# Patient Record
Sex: MALE | Race: WHITE | NOT HISPANIC OR LATINO | Employment: STUDENT | URBAN - METROPOLITAN AREA
[De-identification: names, ages, dates, MRNs, and addresses within clinical notes are randomized per-mention and may not be internally consistent; named-entity substitution may affect disease eponyms.]

---
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
## 2020-06-22 ENCOUNTER — TELEPHONE (OUTPATIENT)
Dept: CARDIOLOGY | Facility: CLINIC | Age: 20
End: 2020-06-22

## 2020-06-22 NOTE — TELEPHONE ENCOUNTER
[facility] Call Center    Phone Message    May a detailed message be left on voicemail: yes     Reason for Call:   Evin's mom Dariusz calling to schedule an appointment for Evin with Dr. Santos in the congenital cardiology clinic. Evin was diagnosed with mild pulmonary valve stenosis, mild dilation of his main pulmonary artery and right branch pulmonary artery, and borderline dilation of his ascending aorta. Jero was a patient of Dr. Theodore Calvo's at the [facility] in [place], but due to insurance changes, Evin is not able to continue his care there. Dariusz mentioned that lately Evin has been having his arms fall asleep on occasion and that when he runs up a flight of stairs he get sort of lightheaded. These started back in February. Dariusz was given the cardiology fax number, and will reach out to [place] to start transferring records. Meghan was not available at the time of the call. Please give Dariusz a call back at your earliest convenience to discuss.  Action Taken: Message routed to:  [facility] ([facility]  Cardio    Travel Screening: Not Applicable

## 2020-06-29 ENCOUNTER — CARE COORDINATION (OUTPATIENT)
Dept: CARDIOLOGY | Facility: CLINIC | Age: 20
End: 2020-06-29

## 2020-06-29 DIAGNOSIS — I37.0 PULMONARY STENOSIS: Primary | ICD-10-CM

## 2020-06-29 DIAGNOSIS — I28.8 DILATATION OF PULMONIC ARTERY (H): ICD-10-CM

## 2020-06-29 NOTE — PROGRESS NOTES
Date: 6/29/2020    Time of Call: 9:57 AM     Diagnosis: History of trivial pulmonary valve stenosis, mild to moderate dilation of the main pulmonary artery and right pulmonary artery, and borderline dilationof the ascenind aorta    [ TORB ] Ordering provider: LEONARDA Provider  Order: Follow up with provider with congenital echo, fasting labs, and EKG     Order received by: Clemente Powers RN     Follow-up/additional notes: NA

## 2020-07-01 NOTE — TELEPHONE ENCOUNTER
Attempted to reach out to Dariusz. The number listed has a mailbox that is not set up. Unable to leave VM.

## 2020-07-17 ENCOUNTER — VIRTUAL VISIT (OUTPATIENT)
Dept: URGENT CARE | Facility: CLINIC | Age: 20
End: 2020-07-17
Payer: COMMERCIAL

## 2020-07-17 ENCOUNTER — COMMUNICATION - HEALTHEAST (OUTPATIENT)
Dept: SCHEDULING | Facility: CLINIC | Age: 20
End: 2020-07-17

## 2020-07-17 ENCOUNTER — NURSE TRIAGE (OUTPATIENT)
Dept: NURSING | Facility: CLINIC | Age: 20
End: 2020-07-17

## 2020-07-17 DIAGNOSIS — Z20.822 SUSPECTED COVID-19 VIRUS INFECTION: ICD-10-CM

## 2020-07-17 DIAGNOSIS — R05.9 COUGH: Primary | ICD-10-CM

## 2020-07-17 PROCEDURE — 99203 OFFICE O/P NEW LOW 30 MIN: CPT | Mod: TEL | Performed by: FAMILY MEDICINE

## 2020-07-17 NOTE — PATIENT INSTRUCTIONS
Instructions for Patients  To schedule an appointment for curbside testing:    Provider/Staff to call and schedule the patient for the appointment per the System Ambulatory Workflow recommendations    Be sure to obtain details about the patients  care for the      Your symptoms show that you may have coronavirus (COVID-19). This illness can cause fever, cough and trouble breathing.     We would like to test you for this virus. This will be a curbside test--you will drive to the clinic, and we will test you in your car.    Please follow these steps:    1. We will call to schedule your test. Be ready to share details about the car you ll be in.   2. A member of our care team will ask you some questions. Then, they will use a swab to collect samples from your nose and throat.     We will test your samples for COVID-19, the flu and maybe other illnesses as well. We will call to share your test results.    How can I protect others?    Stay home and away from others (self-isolate) until:    You ve had no fever--and no medicine that reduces fever--for 3 full days (72 hours). And      Your other symptoms have resolved (gotten better). For example, your cough or breathing has improved. And     At least 10 days have passed since your symptoms started.    Stay at least 6 feet away from others. (If someone will drive you to your test, stay in the backseat, as far away from the  as you can.)     Don t go to work, school or anywhere else. When it s time for your test, go straight to the testing site. Don t make any stops on the way there or back.     Wash your hands and face often. Use soap and water.     Cover your mouth and nose with a mask, tissue or washcloth.     Don t touch anyone. No hugging, kissing or handshakes.    How can I take care of myself?    1. Get lots of rest. Drink extra fluids (unless a doctor has told you not to).     2. Take Tylenol (acetaminophen) for fever or pain. If you have liver or  kidney problems, ask your family doctor if it's okay to take Tylenol.     Adults can take either:     650 mg (two 325 mg pills) every 4 to 6 hours, or     1,000 mg (two 500 mg pills) every 8 hours as needed.     Note: Don't take more than 3,000 mg in one day.   Acetaminophen is found in many medicines (both prescribed and over-the-counter medicines). Read all labels to be sure you don't take too much.   For children, check the Tylenol bottle for the right dose. The dose is based on  the child's age or weight.    3. If you have other health problems (like cancer, heart failure, an organ transplant or severe kidney disease): Call your specialty clinic if you don't feel better in the next 2 days.    4. Know when to call 911: If your breathing is so bad that it keeps you from doing normal activities, call 911 or go to the emergency room. Tell them that you've been staying home and may have COVID-19.      Thank you for limiting contact with others, wearing a simple mask to cover your cough, practice good hand hygiene habits and accessing our virtual services where possible to limit the spread of this virus.    For more information about COVID19 and options for caring for yourself at home, please visit the CDC website at https://www.cdc.gov/coronavirus/2019-ncov/about/steps-when-sick.html  For more options for care at [facility], please visit our website at [email or web address]

## 2020-07-17 NOTE — TELEPHONE ENCOUNTER
Patient was in the [facility] ER two nights ago, dizzy, headache, nausea.  Tests including CT, labs, were all inconclusive.    Today he still has the same symptoms, and caller is wondering if he was tested for Covid-19.  Warm transferred to  nurse.    Varsha Hobbs RN  [facility]

## 2020-07-17 NOTE — PROGRESS NOTES
"The patient has been notified of following:     \"This telephone visit will be conducted via a call between you and your physician/provider. We have found that certain health care needs can be provided without the need for a physical exam.  This service lets us provide the care you need with a short phone conversation.  If a prescription is necessary we can send it directly to your pharmacy.  If lab work is needed we can place an order for that and you can then stop by our lab to have the test done at a later time.    Telephone visits are billed at different rates depending on your insurance coverage. During this emergency period, for some insurers they may be billed the same as an in-person visit.  Please reach out to your insurance provider with any questions.    If during the course of the call the physician/provider feels a telephone visit is not appropriate, you will not be charged for this service.\"    Patient has given verbal consent for Telephone visit?  Yes    How would you like to obtain your AVS? Mail a copy    Subjective   CC: Evin Mendoza  is a 19 year old male who presents via phone visit today for the following health issues:   Chief Complaint   Patient presents with     Suspected Covid        Concern for COVID-19  About how many days ago did these symptoms start? 3d ago  Is this your first visit for this illness? No -seen in ER 7/15 with dizziness, headache, nausea, chest pain, shortness of breath. He had a significant workup due to history including labs and CT, but not covid testing. Since then, he has generally felt better, but continues to have the same symptoms. When nauseated, he feels breathless, but that is brief and resolves. No fever. Does have bodyaches, sore throat, diarrhea. No rash or anosmia.     In the 14 days before your symptoms started, have you had close contact with someone with COVID-19 (Coronavirus)? I do not know. Works in a restaurant.  Are you, or a household member, " a healthcare worker or a ? No  Do you live in a nursing home, group home, or shelter? No  Do you have a way to get food/medications if quarantined? Yes, I have a friend or family member who can help me.                Reviewed and updated as needed this visit by Provider         Review of Systems   As above      Objective    Gen: Patient is alert, oriented  Resp: Speaking full sentence, no audible shortness of breath.  No cough of wheeze.    Diagnostic Test Results:  Labs reviewed in [facility] from [facility] ER visit.           Assessment/Plan:  1. Cough, Suspected COVID-19 virus infection: acute viral sounding illness. Recent ER visit, he is generally better than he was, but continues to have symptoms consistent with covid. Discussed worsening symptoms that should warrant further evaluation. Declines [employer]. Will arrange testing.   - Symptomatic COVID-19 Virus (Coronavirus) by PCR; Future      Phone call duration:  5 minutes    Daniela Alcantar MD

## 2020-07-20 ENCOUNTER — AMBULATORY - HEALTHEAST (OUTPATIENT)
Dept: FAMILY MEDICINE | Facility: CLINIC | Age: 20
End: 2020-07-20

## 2020-07-20 ENCOUNTER — COMMUNICATION - HEALTHEAST (OUTPATIENT)
Dept: SCHEDULING | Facility: CLINIC | Age: 20
End: 2020-07-20

## 2020-07-20 DIAGNOSIS — Z20.822 SUSPECTED COVID-19 VIRUS INFECTION: ICD-10-CM

## 2020-07-20 DIAGNOSIS — R05.9 COUGH: ICD-10-CM

## 2020-07-23 ENCOUNTER — COMMUNICATION - HEALTHEAST (OUTPATIENT)
Dept: EMERGENCY MEDICINE | Facility: CLINIC | Age: 20
End: 2020-07-23

## 2020-07-23 ENCOUNTER — COMMUNICATION - HEALTHEAST (OUTPATIENT)
Dept: CARE COORDINATION | Facility: CLINIC | Age: 20
End: 2020-07-23

## 2020-07-24 ENCOUNTER — OFFICE VISIT - HEALTHEAST (OUTPATIENT)
Dept: INTERNAL MEDICINE | Facility: CLINIC | Age: 20
End: 2020-07-24

## 2020-07-24 DIAGNOSIS — G43.909 MIGRAINE WITHOUT STATUS MIGRAINOSUS, NOT INTRACTABLE, UNSPECIFIED MIGRAINE TYPE: ICD-10-CM

## 2020-07-24 DIAGNOSIS — G93.9 LESION OF BRAIN: ICD-10-CM

## 2020-07-24 DIAGNOSIS — M35.9 DISORDER OF CONNECTIVE TISSUE (H): ICD-10-CM

## 2020-07-24 DIAGNOSIS — I77.810 ASCENDING AORTA DILATATION (H): ICD-10-CM

## 2020-07-24 ASSESSMENT — MIFFLIN-ST. JEOR: SCORE: 1758.34

## 2020-08-03 ENCOUNTER — COMMUNICATION - HEALTHEAST (OUTPATIENT)
Dept: INTERNAL MEDICINE | Facility: CLINIC | Age: 20
End: 2020-08-03

## 2020-08-03 ENCOUNTER — HOSPITAL ENCOUNTER (OUTPATIENT)
Dept: MRI IMAGING | Facility: CLINIC | Age: 20
Discharge: HOME | End: 2020-08-03
Attending: INTERNAL MEDICINE

## 2020-08-03 DIAGNOSIS — G93.9 LESION OF BRAIN: ICD-10-CM

## 2020-08-04 ENCOUNTER — HOSPITAL ENCOUNTER (OUTPATIENT)
Dept: CARDIOLOGY | Facility: CLINIC | Age: 20
Discharge: HOME | End: 2020-08-04
Payer: COMMERCIAL

## 2020-08-04 ENCOUNTER — AMBULATORY - HEALTHEAST (OUTPATIENT)
Dept: INTERNAL MEDICINE | Facility: CLINIC | Age: 20
End: 2020-08-04

## 2020-08-04 ENCOUNTER — RECORDS - HEALTHEAST (OUTPATIENT)
Dept: HEALTH INFORMATION MANAGEMENT | Facility: CLINIC | Age: 20
End: 2020-08-04

## 2020-08-04 DIAGNOSIS — G43.909 MIGRAINE WITHOUT STATUS MIGRAINOSUS, NOT INTRACTABLE, UNSPECIFIED MIGRAINE TYPE: ICD-10-CM

## 2020-08-04 DIAGNOSIS — G93.9 LESION OF BRAIN: ICD-10-CM

## 2020-08-04 DIAGNOSIS — I37.0 PULMONARY STENOSIS: ICD-10-CM

## 2020-08-04 DIAGNOSIS — I28.8 DILATATION OF PULMONIC ARTERY (H): ICD-10-CM

## 2020-08-04 PROCEDURE — 93325 DOPPLER ECHO COLOR FLOW MAPG: CPT

## 2020-08-11 ENCOUNTER — ANCILLARY PROCEDURE (OUTPATIENT)
Dept: CARDIOLOGY | Facility: CLINIC | Age: 20
End: 2020-08-11
Payer: COMMERCIAL

## 2020-08-11 ENCOUNTER — OFFICE VISIT (OUTPATIENT)
Dept: CARDIOLOGY | Facility: CLINIC | Age: 20
End: 2020-08-11
Attending: INTERNAL MEDICINE
Payer: COMMERCIAL

## 2020-08-11 VITALS
HEART RATE: 54 BPM | SYSTOLIC BLOOD PRESSURE: 123 MMHG | HEIGHT: 71 IN | TEMPERATURE: 97.3 F | DIASTOLIC BLOOD PRESSURE: 80 MMHG | OXYGEN SATURATION: 95 % | WEIGHT: 161 LBS | BODY MASS INDEX: 22.54 KG/M2

## 2020-08-11 DIAGNOSIS — R03.0 ELEVATED BLOOD PRESSURE READING WITHOUT DIAGNOSIS OF HYPERTENSION: ICD-10-CM

## 2020-08-11 DIAGNOSIS — I28.8 DILATATION OF PULMONIC ARTERY (H): ICD-10-CM

## 2020-08-11 DIAGNOSIS — I37.0 PULMONARY STENOSIS: ICD-10-CM

## 2020-08-11 DIAGNOSIS — R55 SYNCOPE: ICD-10-CM

## 2020-08-11 DIAGNOSIS — R55 VASOVAGAL SYNCOPE: ICD-10-CM

## 2020-08-11 DIAGNOSIS — I37.0 NONRHEUMATIC PULMONARY VALVE STENOSIS: Primary | ICD-10-CM

## 2020-08-11 LAB
CHOLEST SERPL-MCNC: 162 MG/DL
HDLC SERPL-MCNC: 53 MG/DL
LDLC SERPL CALC-MCNC: 94 MG/DL
NONHDLC SERPL-MCNC: 109 MG/DL
TRIGL SERPL-MCNC: 74 MG/DL

## 2020-08-11 PROCEDURE — 93005 ELECTROCARDIOGRAM TRACING: CPT | Mod: XU

## 2020-08-11 PROCEDURE — 0298T LEADLESS EKG MONITOR 3 TO 14 DAYS: CPT | Mod: ZP | Performed by: INTERNAL MEDICINE

## 2020-08-11 PROCEDURE — 93010 ELECTROCARDIOGRAM REPORT: CPT | Mod: ZP | Performed by: INTERNAL MEDICINE

## 2020-08-11 PROCEDURE — 0296T LEADLESS EKG MONITOR 3 TO 14 DAYS: CPT | Mod: ZF

## 2020-08-11 PROCEDURE — G0463 HOSPITAL OUTPT CLINIC VISIT: HCPCS | Mod: 25,ZF

## 2020-08-11 RX ORDER — METOCLOPRAMIDE 10 MG/1
10 TABLET ORAL
COMMUNITY
Start: 2020-07-20 | End: 2020-08-21

## 2020-08-11 RX ORDER — ONDANSETRON 4 MG/1
4 TABLET, ORALLY DISINTEGRATING ORAL
COMMUNITY
Start: 2020-07-20 | End: 2021-04-14

## 2020-08-11 ASSESSMENT — MIFFLIN-ST. JEOR: SCORE: 1767.42

## 2020-08-11 ASSESSMENT — PAIN SCALES - GENERAL: PAINLEVEL: NO PAIN (0)

## 2020-08-11 NOTE — PATIENT INSTRUCTIONS
You were seen today in the Adult Congenital and Cardiovascular Genetics Clinic at the [facility].    Cardiology Providers you saw during your visit:  Dr Jeremy Cottrell    Diagnosis:  History of pulmonary stenosis    Results:  Dr Cottrell reviewed the results of your echo and labs with you in clinic today     Recommendations:    1.  Continue to eat a heart healthy, low salt diet.  2.  Continue to get 20-30 minutes of aerobic activity, 4-5 days per week.  Examples of aerobic activity include walking, running, swimming, cycling, etc.  Please include light strength training with your exercise routine.   3.  Continue to observe good oral hygiene, with regular dental visits.  4.  We will place an ambulatory blood pressure monitor  5.  We will place a 7 day zio monitor for you to wear  6.  Dr Cottrell's email address is [email or web address]  7.  Please drink at least 80 ounces of fluid with at least 2 servings having electrolytes in them      SBE prophylaxis:   Yes____  No__x__    Lifelong Bacterial Endocarditis Prophylaxis:  YES____  NO____    If YES is checked, follow the recommendations outlined below:   1. Take antibiotic(s) prior to recommended dental procedures and procedures on the respiratory tract or with infected skin, muscle or bones. SBE prophylaxis is not needed for routine GI and  procedures (ie. Colonoscopy or vaginal delivery)   2. Observe good oral hygiene daily, as advised by your dentist. Get regular professional dental care.   3. Keep cuts clean.   4. Infections should be treated promptly.   5. Symptoms of Infective Endocarditis could include: fever lasting more than 4-5 days or a recurrent fever that initially resolves but returns within 1-2 days)     Exercise restrictions:   Yes____  No__x__         If yes, list restrictions:  Must be allowed to rest if fatigued or SOB      Work restrictions:  Yes____  No_x___         If yes, list restrictions:    FASTING CHOLESTEROL was checked in the last 5 years  YES_x__  NO___  2020  Continue to eat a heart healthy, low salt diet.         ____ Fasting lipid panel order today         ____ No changes in medications          ____ I recommend the following changes in your cholesterol medications.:          ____ Please follow up for cholesterol screening at your primary care physician      Follow-up:  Follow up with Dr Cottrell in December with a possible stress test.      For after hours urgent needs, call [phone] and ask to speak to the Adult Congenital Physician on call.  Mention Job Code 0401.    For emergencies call 521.    For any scheduling needs, please call Meghan Andersen Procedure , at [phone]  Thank you for your visit today!  If you have questions or concerns about today's visit, please call me.    Clemente Powers RN, BSN  Cardiology Care Coordinator  [facility]  [phone] [address]  Mail Code [id number]  [place] [address]

## 2020-08-11 NOTE — PROGRESS NOTES
Per Dr. Cottrell, patient to have 7 day Zio Patch monitor placed.  Diagnosis: Syncope, R55  Monitor placed: Yes  Patient Instructed: Yes  Patient verbalized understanding: Yes  Holter # [id number]    Monitor placed by Gaetano Gonzalez. Documented by Alverto Mcmahon CMA  1:30 PM

## 2020-08-11 NOTE — LETTER
Date:September 11, 2020      Provider requested that no letter be sent. Do not send.       [facility] Health Information

## 2020-08-11 NOTE — LETTER
8/11/2020      RE: Evin Mendoza  [address] [address]       Dear Colleague,    Thank you for the opportunity to participate in the care of your patient, Evin Mendoza, at the [facility]. Please see a copy of my visit note below.    Pending    Please do not hesitate to contact me if you have any questions/concerns.     Sincerely,     Jeremy Cottrell MD

## 2020-08-12 LAB — INTERPRETATION ECG - MUSE: NORMAL

## 2020-08-19 ENCOUNTER — HOSPITAL ENCOUNTER (OUTPATIENT)
Dept: CARDIOLOGY | Facility: CLINIC | Age: 20
Discharge: HOME | End: 2020-08-19
Payer: COMMERCIAL

## 2020-08-19 DIAGNOSIS — R03.0 ELEVATED BLOOD PRESSURE READING WITHOUT DIAGNOSIS OF HYPERTENSION: ICD-10-CM

## 2020-08-19 PROCEDURE — 93786 AMBL BP MNTR W/SW REC ONLY: CPT

## 2020-08-19 PROCEDURE — 93790 AMBL BP MNTR W/SW I&R: CPT | Performed by: INTERNAL MEDICINE

## 2020-08-20 SDOH — HEALTH STABILITY: MENTAL HEALTH: HOW OFTEN DO YOU HAVE A DRINK CONTAINING ALCOHOL?: NEVER

## 2020-08-21 ENCOUNTER — OFFICE VISIT (OUTPATIENT)
Dept: NEUROLOGY | Facility: CLINIC | Age: 20
End: 2020-08-21
Payer: COMMERCIAL

## 2020-08-21 ENCOUNTER — RECORDS - HEALTHEAST (OUTPATIENT)
Dept: ADMINISTRATIVE | Facility: OTHER | Age: 20
End: 2020-08-21

## 2020-08-21 VITALS — HEIGHT: 71 IN | WEIGHT: 161 LBS | BODY MASS INDEX: 22.54 KG/M2

## 2020-08-21 DIAGNOSIS — G43.809 VESTIBULAR MIGRAINE: Primary | ICD-10-CM

## 2020-08-21 PROBLEM — I28.8 DILATATION OF PULMONIC ARTERY (H): Status: ACTIVE | Noted: 2020-07-24

## 2020-08-21 PROBLEM — G43.909 MIGRAINE HEADACHE: Status: ACTIVE | Noted: 2018-11-08

## 2020-08-21 PROBLEM — D17.9 BENIGN LIPOMATOUS TUMOR: Status: ACTIVE | Noted: 2020-08-21

## 2020-08-21 PROBLEM — H90.5 SENSORINEURAL HEARING LOSS: Status: ACTIVE | Noted: 2018-11-08

## 2020-08-21 PROBLEM — M35.9 DISORDER OF CONNECTIVE TISSUE (H): Status: ACTIVE | Noted: 2020-07-24

## 2020-08-21 PROBLEM — I77.810 ASCENDING AORTA DILATATION (H): Status: ACTIVE | Noted: 2017-04-12

## 2020-08-21 PROCEDURE — 99205 OFFICE O/P NEW HI 60 MIN: CPT | Performed by: PSYCHIATRY & NEUROLOGY

## 2020-08-21 RX ORDER — RIZATRIPTAN BENZOATE 10 MG/1
10 TABLET, ORALLY DISINTEGRATING ORAL
Qty: 18 TABLET | Refills: 11 | Status: SHIPPED | OUTPATIENT
Start: 2020-08-21 | End: 2022-11-09

## 2020-08-21 RX ORDER — DIVALPROEX SODIUM 500 MG/1
500 TABLET, EXTENDED RELEASE ORAL DAILY
Qty: 30 TABLET | Refills: 11 | Status: SHIPPED | OUTPATIENT
Start: 2020-08-21 | End: 2021-04-14

## 2020-08-21 ASSESSMENT — MIFFLIN-ST. JEOR: SCORE: 1767.42

## 2020-08-21 NOTE — LETTER
2020         RE: Evin Mendoza  [address] [address]        Dear Colleague,    Thank you for referring your patient, Evin Mendoza, to the [facility] [place]. Please see a copy of my visit note below.    NEUROLOGY CONSULTATION NOTE       [facility] [place]  [address] [place] [address]  Tel: [phone]  Fax: [phone]  [email or web address]     Evin Mendoza,  2000, MRN [id number]  PCP: Jeremy Cottrell, [phone]  Date: 2020     ASSESSMENT & PLAN     Diagnosis code: Vestibular Migraine     Vestibular migraine  Pleasant 19-year-old male with history of pulmonary valve stenosis, borderline dilatation of ascending aorta, hyperextensibility, right cerebellopontine angle lipoma, left posterior thalamic indeterminate lesion who was referred for evaluation of progressively worsening headaches associated with vertigo.  His symptoms raise the possibility of vestibular migraine and as he is noticing increased headache frequency and intensity I have started him on Depakote  mg nightly.  For abortive treatment he will use Maxalt MLT 10 mg at the onset, he can repeat 1 tablet in 2 hours if needed.  He recently had a repeat MRI of the head and right CP angle lipoma and left posterior thalamic lesion has remained unchanged.  Follow-up will be in 2 months.    Hyperextensibility, right CP angle lipoma & left posterior thalamic indeterminate lesion  Patient is being followed at [facility] for right CP angle lipoma and left posterior thalamic indeterminate lesion that has remained stable over the years.  He was also evaluated by ENT and was noted to have right sensorineural hearing loss and genetic testing was offered for possible Marfan syndrome, Tracie-Danlos syndrome.  Initially patient refused but he is now interested in getting that test.  As there is a family history of brain aneurysm rupture and  with his history of hyperextensibility, possible Marfan syndrome, Tracie-Danlos syndrome I have recommended checking MRA of head to rule out cerebral aneurysm.  Follow-up will be in 2 months.    Thank you again for this referral, please feel free to contact me if you have any questions.    Lew Fitzpatrick MD  [facility]  (Formerly, [facility]     REASON FOR CONSULTATION Headache       HISTORY OF PRESENT ILLNESS     We have been requested by Dr. Cottrell to evaluate Evin Mendoza who is a 19 year old  male for headaches    Patient is a pleasant 19-year-old male with history of pulmonary valve stenosis, borderline dilatation of the ascending aorta, hyperextensibility, right cerebellar pontine angle lipoma with sensorineural hearing loss and left posterior thalamic indeterminant lesion who was referred for evaluation of headaches that started in 2015.  According to patient he usually gets these headaches that are preceded by difficulty concentrating, vertigo and pressure-like feeling that will emanate from the back of his neck to the front and is associated with nausea and light sensitivity.  He has noticed that change in weather tends to make the symptoms worse.  Lately these symptoms are progressively getting worse and he is getting these symptoms almost on a daily basis.  He was seen at urgent care and was given Zofran.  In the past he was tried on Depakote that did seem to help and he weaned himself off Depakote but lately has noticed gradually increasing frequency and intensity of the headache.  He cannot identify any triggering events.  He recently had a MRI of the head that showed no acute intracranial process but there was asymmetric nonenhancing T2 flair hyperintensity in the left posterior thalamus and a small 6 x 3 mm lipoma in the right cerebellopontine angle cistern.  These abnormalities have been present for some time and he is being followed at  [facility] and looking back up to 2016 there is no significant change.    Patient was evaluated at [facility] in the past for right-sided hearing loss, migraine headaches and MRI abnormality.  As noted above it showed a left thalamic lesion and right cerebellopontine angle abnormalities that were followed over time and remained stable.  There was no mass-effect and as it has not changed over time it is quite reassuring.  Patient has hypermobility and echocardiogram abnormalities and was evaluated by genetics and cardiology and question of Marfan syndrome or Tracie-Danlos syndrome was raised.  Although genetic testing was offered they decided against it but now willing to pursue it.  As noted above he also has sensorineural hearing loss on the right side.     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    Surgical History:  He  has no past surgical history on file.     SOCIAL HISTORY     Reviewed, and he  reports that he has never smoked. He has never used smokeless tobacco. He reports that he does not drink alcohol.     FAMILY HISTORY     Reviewed, and family history includes Depression in his maternal grandmother; Hyperlipidemia in his mother; Hypertension in his maternal grandfather, mother, and paternal grandfather; Migraines in his father; Myocardial Infarction in his paternal grandfather; Osteoporosis in his maternal grandmother.     ALLERGIES     Allergies   Allergen Reactions     Amoxicillin Hives     Oxycodone Other (See Comments) and Swelling     Other reaction(s): facial swelling  Face swelling       Penicillins Hives, Rash and Unknown     Other  "reaction(s): hive         REVIEW OF SYSTEMS     A 12 point review of system was performed and was negative except as outlined in the history of present illness.     HOME MEDICATIONS       Current Outpatient Medications:      ondansetron (ZOFRAN-ODT) 4 MG ODT tab, Take 4 mg by mouth, Disp: , Rfl:      metoclopramide (REGLAN) 10 MG tablet, Take 10 mg by mouth, Disp: , Rfl:       PHYSICAL EXAM     Vital signs  Ht 1.803 m (5' 11\")   Wt 73 kg (161 lb)   BMI 22.45 kg/m      Weight:   161 lbs 0 oz    GENERAL PHYSICAL EXAM: Patient is alert and oriented x 4 in no acute distress. Neck was supple, no carotid bruits, thyromegaly, lymphadenopathy or JVD noted.   NEUROLOGICAL EXAM:  Mental Status  Patient is A&O x 4. Patient recalls 3/3 objects at 5 minutes.  Speech  Speech is clear and fluent with good repetition, comprehension, and naming both for objects and parts of an object. Written and verbal comprehension is intact.  Cranial Nerves  CN II: Visual fields are full to confrontation. Fundoscopic exam is normal with sharp discs and no vascular changes. Venous pulsations are present bilaterally. Pupils are equal and reactive to light.   CN III, IV, VI: EOMI, PERRLA  CN V: Facial sensation is intact to pinprick in all 3 divisions bilaterally. Corneal responses are intact.  CN VII: Face is symmetric with normal eye closure and smile.  CN VII: Hearing is normal to rubbing fingers  CN IX, X: Palate elevates symmetrically. Phonation is normal.  CN XI: Head turning and shoulder shrug are intact  CN XII: Tongue is midline with normal movements and no atrophy.  Motor Exam  Muscle bulk and tone are normal. No pronator drift. Strength is 5/5 bilaterally. No fasciculations noted.  Reflexes  Reflexes are 2+ and symmetric at the biceps, triceps, knees, and ankles. Plantar responses are flexor.  Sensory Exam  Light touch, pinprick, position sense, and vibration sense are intact bilaterally. No astereognosia, agraphesthesia or extinction " to bilateral simultaneous stimulation.  Coordination  Rapid alternating movements and fine finger movements are intact. No dysmetria on FNF and HKS. Romberg negative.  Gait  Posture is normal.Tandem gait is normal. Able to walk on toes and heels.     DIAGNOSTIC STUDIES     PERTINENT RADIOLOGY  Following imaging studies were reviewed:     MRI BRAIN 8/3/20  1.  No acute intracranial process.  2.  Asymmetric nonenhancing T2 FLAIR hyperintensity left posterior thalamus.  3.  Small 6 x 3 mm lipoma right cerebellopontine angle cistern.    MRI HEAD 10/3/18  Stable mild enlargement and ill-defined T2 FLAIR hyperintensity within the  dorsal left thalamus. No associated enhancement or restricted diffusion. Stable  3 x 7 mm nonenhancing T1 hyperintensity within the inferior right cerebellar  pontine angle, likely representing a small lipoma. Previous mucosal thickening  in the paranasal sinuses has near completely resolved. The remainder of the  examination is unremarkable.    MRI BRAIN 4/12/17  No significant interval change. Compared to prior examination, there has been no significant interval change. Again seen is a tiny focus of increased T2 signal without enhancement or restricted diffusion within the dorsal left thalamus. No associated mass effect. Stable appearance to the previously seen tiny approximately 6 x 2 mm right inferior cerebellopontine angle lipoma, again without mass effect. Mild mucosal thickening in the paranasal sinuses. Remainder negative.    MRI BRAIN 4/21/16   No significant change since the prior study. Again seen is a small subtle area of signal abnormality in the dorsal left thalamus without associated pathologic enhancement. Stable small right inferior cerebellopontine angle lipoma (series 500, image 16). Mucous retention cyst right maxillary sinus. Remainder negative.      PERTINENT LABS  Following labs were reviewed:  Office Visit on 08/11/2020   Component Date Value     Interpretation ECG  08/11/2020 Click View Image link to view waveform and result    Orders Only on 08/11/2020   Component Date Value     Cholesterol 08/11/2020 162      Triglycerides 08/11/2020 74      HDL Cholesterol 08/11/2020 53      LDL Cholesterol Calculat* 08/11/2020 94      Non HDL Cholesterol 08/11/2020 109         Total time spent for face to face visit, reviewing labs/imaging studies, counseling and coordination of care was: 1 Hour 15 Minutes More than 50% of this time was spent on counseling and coordination of care.      This note was dictated using voice recognition software.  Any grammatical or context distortions are unintentional and inherent to the software.       Again, thank you for allowing me to participate in the care of your patient.        Sincerely,        Lew Fitzpatrick MD

## 2020-08-21 NOTE — NURSING NOTE
Interface, Rad Results In - 08/03/2020 11:33 AM CDT    EXAM: MR BRAIN W WO CONTRAST  LOCATION: [facility]  DATE/TIME: 8/3/2020 8:41 AM    INDICATION: Headache, chronic, with new features. Change in headache pattern, abnormal brain MRI most recently imaged at [facility] in 2018 (right CPA mass), left thalamic lesion.  COMPARISON: None.  CONTRAST: 7.5 mL Gadavist.  TECHNIQUE: Routine multiplanar multisequence head MRI without and with intravenous contrast.    FINDINGS:  INTRACRANIAL CONTENTS: No acute or subacute infarct. No mass, acute hemorrhage, or extra-axial fluid collections. Subtle asymmetric T2 FLAIR hyperintense signal within the left posterior thalamus measuring 1.0 x 1.6 cm. No associated diffusion signal   change or enhancement. Otherwise normal parenchymal signal intensity. Ovoid intrinsic T1 hyperintense nodule within the right cerebellopontine angle cistern measuring 6 x 4 mm with fat attenuation on previous CT, consistent with a small lipoma. Normal   ventricles and sulci. Normal position of the cerebellar tonsils. No pathologic contrast enhancement.    Perfusion imaging shows normal symmetric pattern without focal elevated cerebral blood volume.    SELLA: No abnormality accounting for technique.    OSSEOUS STRUCTURES/SOFT TISSUES: Normal marrow signal. The major intracranial vascular flow voids are maintained.     ORBITS: No abnormality accounting for technique.     SINUSES/MASTOIDS: No paranasal sinus mucosal disease. No middle ear or mastoid effusion.     IMPRESSION:   1. No acute intracranial process.  2. Asymmetric nonenhancing T2 FLAIR hyperintensity left posterior thalamus.  3. Small 6 x 3 mm lipoma right cerebellopontine angle cistern.    Prior brain MRI from [facility] has been requested for direct comparison. When these images are available an addendum will be issued.

## 2020-08-21 NOTE — PROGRESS NOTES
NEUROLOGY CONSULTATION NOTE       [facility] [place]  [address] [place] [address]  Tel: [phone]  Fax: [phone]  [email or web address] [email or web address]     Evin Mendoza,  2000, MRN [id number]  PCP: Jeremy Cottrell, [phone]  Date: 2020     ASSESSMENT & PLAN     Diagnosis code: Vestibular Migraine     Vestibular migraine  Pleasant 19-year-old male with history of pulmonary valve stenosis, borderline dilatation of ascending aorta, hyperextensibility, right cerebellopontine angle lipoma, left posterior thalamic indeterminate lesion who was referred for evaluation of progressively worsening headaches associated with vertigo.  His symptoms raise the possibility of vestibular migraine and as he is noticing increased headache frequency and intensity I have started him on Depakote  mg nightly.  For abortive treatment he will use Maxalt MLT 10 mg at the onset, he can repeat 1 tablet in 2 hours if needed.  He recently had a repeat MRI of the head and right CP angle lipoma and left posterior thalamic lesion has remained unchanged.  Follow-up will be in 2 months.    Hyperextensibility, right CP angle lipoma & left posterior thalamic indeterminate lesion  Patient is being followed at [facility] for right CP angle lipoma and left posterior thalamic indeterminate lesion that has remained stable over the years.  He was also evaluated by ENT and was noted to have right sensorineural hearing loss and genetic testing was offered for possible Marfan syndrome, Tracie-Danlos syndrome.  Initially patient refused but he is now interested in getting that test.  As there is a family history of brain aneurysm rupture and with his history of hyperextensibility, possible Marfan syndrome, Rtacie-Danlos syndrome I have recommended checking MRA of head to rule out cerebral aneurysm.  Follow-up will be in 2 months.    Thank you again for this referral, please feel free to contact me if you  have any questions.    Lew Fitzpatrick MD  [facility]  (Formerly, [facility]     REASON FOR CONSULTATION Headache       HISTORY OF PRESENT ILLNESS     We have been requested by Dr. Cottrell to evaluate Evin Mendoza who is a 19 year old  male for headaches    Patient is a pleasant 19-year-old male with history of pulmonary valve stenosis, borderline dilatation of the ascending aorta, hyperextensibility, right cerebellar pontine angle lipoma with sensorineural hearing loss and left posterior thalamic indeterminant lesion who was referred for evaluation of headaches that started in 2015.  According to patient he usually gets these headaches that are preceded by difficulty concentrating, vertigo and pressure-like feeling that will emanate from the back of his neck to the front and is associated with nausea and light sensitivity.  He has noticed that change in weather tends to make the symptoms worse.  Lately these symptoms are progressively getting worse and he is getting these symptoms almost on a daily basis.  He was seen at urgent care and was given Zofran.  In the past he was tried on Depakote that did seem to help and he weaned himself off Depakote but lately has noticed gradually increasing frequency and intensity of the headache.  He cannot identify any triggering events.  He recently had a MRI of the head that showed no acute intracranial process but there was asymmetric nonenhancing T2 flair hyperintensity in the left posterior thalamus and a small 6 x 3 mm lipoma in the right cerebellopontine angle cistern.  These abnormalities have been present for some time and he is being followed at [facility] and looking back up to 2016 there is no significant change.    Patient was evaluated at [facility] in the past for right-sided hearing loss, migraine headaches and MRI abnormality.  As noted above it showed a left thalamic lesion and right  cerebellopontine angle abnormalities that were followed over time and remained stable.  There was no mass-effect and as it has not changed over time it is quite reassuring.  Patient has hypermobility and echocardiogram abnormalities and was evaluated by genetics and cardiology and question of Marfan syndrome or Tracie-Danlos syndrome was raised.  Although genetic testing was offered they decided against it but now willing to pursue it.  As noted above he also has sensorineural hearing loss on the right side.     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    Surgical History:  He  has no past surgical history on file.     SOCIAL HISTORY     Reviewed, and he  reports that he has never smoked. He has never used smokeless tobacco. He reports that he does not drink alcohol.     FAMILY HISTORY     Reviewed, and family history includes Depression in his maternal grandmother; Hyperlipidemia in his mother; Hypertension in his maternal grandfather, mother, and paternal grandfather; Migraines in his father; Myocardial Infarction in his paternal grandfather; Osteoporosis in his maternal grandmother.     ALLERGIES     Allergies   Allergen Reactions     Amoxicillin Hives     Oxycodone Other (See Comments) and Swelling     Other reaction(s): facial swelling  Face swelling       Penicillins Hives, Rash and Unknown     Other reaction(s): hive         REVIEW OF SYSTEMS     A 12 point review of system was performed and was negative except as outlined in the history of present illness.     HOME MEDICATIONS       Current Outpatient Medications:      ondansetron (ZOFRAN-ODT) 4 MG  "ODT tab, Take 4 mg by mouth, Disp: , Rfl:      metoclopramide (REGLAN) 10 MG tablet, Take 10 mg by mouth, Disp: , Rfl:       PHYSICAL EXAM     Vital signs  Ht 1.803 m (5' 11\")   Wt 73 kg (161 lb)   BMI 22.45 kg/m      Weight:   161 lbs 0 oz    GENERAL PHYSICAL EXAM: Patient is alert and oriented x 4 in no acute distress. Neck was supple, no carotid bruits, thyromegaly, lymphadenopathy or JVD noted.   NEUROLOGICAL EXAM:  Mental Status  Patient is A&O x 4. Patient recalls 3/3 objects at 5 minutes.  Speech  Speech is clear and fluent with good repetition, comprehension, and naming both for objects and parts of an object. Written and verbal comprehension is intact.  Cranial Nerves  CN II: Visual fields are full to confrontation. Fundoscopic exam is normal with sharp discs and no vascular changes. Venous pulsations are present bilaterally. Pupils are equal and reactive to light.   CN III, IV, VI: EOMI, PERRLA  CN V: Facial sensation is intact to pinprick in all 3 divisions bilaterally. Corneal responses are intact.  CN VII: Face is symmetric with normal eye closure and smile.  CN VII: Hearing is normal to rubbing fingers  CN IX, X: Palate elevates symmetrically. Phonation is normal.  CN XI: Head turning and shoulder shrug are intact  CN XII: Tongue is midline with normal movements and no atrophy.  Motor Exam  Muscle bulk and tone are normal. No pronator drift. Strength is 5/5 bilaterally. No fasciculations noted.  Reflexes  Reflexes are 2+ and symmetric at the biceps, triceps, knees, and ankles. Plantar responses are flexor.  Sensory Exam  Light touch, pinprick, position sense, and vibration sense are intact bilaterally. No astereognosia, agraphesthesia or extinction to bilateral simultaneous stimulation.  Coordination  Rapid alternating movements and fine finger movements are intact. No dysmetria on FNF and HKS. Romberg negative.  Gait  Posture is normal.Tandem gait is normal. Able to walk on toes and heels. "     DIAGNOSTIC STUDIES     PERTINENT RADIOLOGY  Following imaging studies were reviewed:     MRI BRAIN 8/3/20  1.  No acute intracranial process.  2.  Asymmetric nonenhancing T2 FLAIR hyperintensity left posterior thalamus.  3.  Small 6 x 3 mm lipoma right cerebellopontine angle cistern.    MRI HEAD 10/3/18  Stable mild enlargement and ill-defined T2 FLAIR hyperintensity within the  dorsal left thalamus. No associated enhancement or restricted diffusion. Stable  3 x 7 mm nonenhancing T1 hyperintensity within the inferior right cerebellar  pontine angle, likely representing a small lipoma. Previous mucosal thickening  in the paranasal sinuses has near completely resolved. The remainder of the  examination is unremarkable.    MRI BRAIN 4/12/17  No significant interval change. Compared to prior examination, there has been no significant interval change. Again seen is a tiny focus of increased T2 signal without enhancement or restricted diffusion within the dorsal left thalamus. No associated mass effect. Stable appearance to the previously seen tiny approximately 6 x 2 mm right inferior cerebellopontine angle lipoma, again without mass effect. Mild mucosal thickening in the paranasal sinuses. Remainder negative.    MRI BRAIN 4/21/16   No significant change since the prior study. Again seen is a small subtle area of signal abnormality in the dorsal left thalamus without associated pathologic enhancement. Stable small right inferior cerebellopontine angle lipoma (series 500, image 16). Mucous retention cyst right maxillary sinus. Remainder negative.      PERTINENT LABS  Following labs were reviewed:  Office Visit on 08/11/2020   Component Date Value     Interpretation ECG 08/11/2020 Click View Image link to view waveform and result    Orders Only on 08/11/2020   Component Date Value     Cholesterol 08/11/2020 162      Triglycerides 08/11/2020 74      HDL Cholesterol 08/11/2020 53      LDL Cholesterol Calculat* 08/11/2020  94      Non HDL Cholesterol 08/11/2020 109         Total time spent for face to face visit, reviewing labs/imaging studies, counseling and coordination of care was: 1 Hour 15 Minutes More than 50% of this time was spent on counseling and coordination of care.      This note was dictated using voice recognition software.  Any grammatical or context distortions are unintentional and inherent to the software.

## 2020-09-11 NOTE — PROGRESS NOTES
"Adult Congenital Cardiology New Patient Visit    Patient:  Evin Mendoza MRN:  [id number]   YOB: 2000 Age:  19 year old   Date of Visit:  Aug 11, 2020 PCP:  Unknown        I had the pleasure of seeing your patient Evin Mendoza at the [facility] on Aug 11, 2020.  Evin is a 20 yo who is here today with his mother Dariusz to establish care in our [facility] clinic for a history of aortic dilation. Alo has been cared for through the [facility] system and is transferring care locally due to changes in insurance coverage. Alo was diagnosed with aortic dilation in 2017 by Dr. Calvo. I do not have access to that study or other echos obtained at [facility] at the present time. He has not been on any medication for this and had last follow up in 2018. Family has been told that \"he grew into it\".     Evin notes that he has decrease exercise tolerance over the last year or two. He has heart rate increases to 186 with walking and gets winded with hills. He often notes a racing heart beat after exercise is complete- often to the 180's or 190's and feels pulsations in his head and neck. He feels like this has been increasing over the last few years. He also notes that he has been less active.  He does not have regular exercise induced chest pain, but rare chest discomfort that self resolves. He complains of almost constant dizziness or vertigo that is difficult to characterize. Evin has severe migraines. 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 He also has Loss of hearing in one ear and is followed by neuro/ENT at [facility]. Current plan is observation.     Evin was last evaluated at [facility] in July with a severe headache. No LOC, seizures, orthopnea, edema or other cardiac symptoms.     Evin has always been \"bendy\" with flexible joints. He has stretch marks on his skin.     Past " medical history:    Born 34-35 weeks EGA due to maternal hypertension. C/Sec due to breech presentation. BW 5 lb 2 ounces. Home at 3 days of age. Maternal thyroid tumor required operative removal 3rd trimester.    Frequent URI/OM in childhood. Normal development.   Severe migraines and CNS lipoma as noted above and in neurology records.  Ortho injuries related to skiing - followed at [facility]     Surgical Hx:  Tympanostomy tubes age approx 18 months  ? tonsillectomy  Removal Penile cyst   Broken wrist requiring surg x 2 ([facility])      Cardiac History:   Screening testing 2017 with Dr. Umesh Cortez with aortic enlargement  ?Offerred testing for connective tissue disorders  Repeat Echo 2018  CTA chest July 2020 [facility]   CT ANGIOGRAM CHEST, ABDOMEN, AND PELVIS: No aortic dissection or significant aneurysm. Ascending aorta appears borderline ectatic, measuring 3.4 cm maximal diameter. No visualized pulmonary embolism. Celiac axis, SMA, renal arteries and DALIA are patent.    No cardiac medications  Medications are for migraine management.       Current Outpatient Medications   Medication Sig Dispense Refill     divalproex sodium extended-release (DEPAKOTE ER) 500 MG 24 hr tablet Take 1 tablet (500 mg) by mouth daily 30 tablet 11     ondansetron (ZOFRAN-ODT) 4 MG ODT tab Take 4 mg by mouth       rizatriptan (MAXALT-MLT) 10 MG ODT Take 1 tablet (10 mg) by mouth at onset of headache for migraine (May repaet x 1 in 2 hours) May repeat in 2 hours. Max 3 tablets/24 hours. 18 tablet 11       Allergies   Allergen Reactions     Amoxicillin Hives     Oxycodone Other (See Comments) and Swelling     Other reaction(s): facial swelling  Face swelling       Penicillins Hives, Rash and Unknown     Other reaction(s): hive     Family History:   Family History   Problem Relation Age of Onset     Hypertension Mother      Hyperlipidemia Mother      Migraines Father      Myocardial Infarction Paternal Grandfather      Hypertension  "Paternal Grandfather      Depression Maternal Grandmother      Osteoporosis Maternal Grandmother      Hypertension Maternal Grandfather      Cerebral aneurysm Other       3 brothers, Evin is middle. Odler brother age 23 yrs- has thyroid tumor  Younger brother age 17 also \"bendy\" with stretch marks. Great uncle with cerebral hemorrhage age 23 yrs.   FH of hypertension/hyperlipidemia/migraines.     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   Social History     Occupational History     Not on file   Tobacco Use     Smoking status: Never Smoker     Smokeless tobacco: Never Used   Substance and Sexual Activity     Alcohol use: Never     Frequency: Never     Drug use: Not on file     Sexual activity: Not on file       Marital Status: Single.  Denies EtOH, smoking/vaping    Review of Systems: A comprehensive review of systems was performed and is negative, except as noted in the HPI and PMH  Review of Systems     Physical exam:  His height is 1.803 m (5' 11\") and weight is 73 kg (161 lb). His temperature is 97.3  F (36.3  C). His blood pressure is 123/80 and his pulse is 54. His oxygen saturation is 95%.   His body mass index is 22.45 kg/m .  His body surface area is 1.91 meters squared.  Evin is well appearing.   There is no central or peripheral cyanosis. Pupils are reactive and sclera are not jaundiced or discolored. There is no conjunctival injection or discharge. EOMI. Mucous membranes are moist and pink. Dentition appears healthy. Neck supple   Lungs are clear to ausculation bilaterally with no wheezes, rales or rhonchi. There is no increased work of breathing, retractions or nasal flaring. Precordium is quiet with a normally placed apical impulse. On auscultation, heart sounds are regular with normal S1 and physiologically split S2. There are no murmurs, rubs or gallops.  Abdomen is soft and non-tender without masses or hepatomegaly. Femoral pulses are normal with no brachial femoral " delay.Skin is without rashes, lesions, or significant bruising.+ Stria.  Extremities are warm and well-perfused with no cyanosis, clubbing or edema. Peripheral pulses are normal and there is < 2 sec capillary refill. Patient is alert and oriented and moves all extremities equally with normal tone.         12 Lead EKG performed today shows normal sinus bradycardia at a rate of 53  bpm with normal intervals and no chamber enlargement or hypertrophy. There is an early repolarization pattern that is most often a normal variant.     An echocardiogram performed today is notable for    Recent Results (from the past 4320 hour(s))   Echo Pediatric Congenital (TTE)    Narrative    [id number]  [id number]  [id number]  [id number]^URBAN^JEREMY^CHRISTIE                                                                  Study ID: [id number]                                                 [facility] [place] [address]                                                Phone: [phone]                                Pediatric Echocardiogram  _____________________________________________________________________________  __     Name: KAN MACEDO  Study Date: 2020 10:01 AM               Patient Location: [facility]  MRN: [id number]                               Age: 19 yrs  : 2000                               BP: 118/74 mmHg  Gender: Male                                  HR: 51  Patient Class: Outpatient                     Height: 178 cm  Ordering Provider: JEREMY DUGGAN                Weight: 72 kg  Referring Provider: JEREMY DUGGAN         BSA: 1.9 m2  Performed By: Jeremy Lynn [facility]  Report approved by: Marcelino Carballo MD  Reason For Study: Pulmonary stenosis, Dilatation of pulmonic artery  (H)  _____________________________________________________________________________  __     ------CONCLUSIONS------  Normal echocardiogram. There is normal appearance and motion of the tricuspid,  mitral, pulmonary and aortic valves. No atrial, ventricular or arterial level  shunting. RPA appears normal, the main and left PA are not seen well. The  aortic root at the sinus of Valsalva, sinotubular ridge and proximal ascending  aorta are normal. The left and right ventricles have normal chamber size, wall  thickness, and systolic function. The calculated biplane left ventricular  ejection fraction is 63 %.  _____________________________________________________________________________  __        Technical information:  A complete two dimensional, MMODE, spectral and color Doppler transthoracic  echocardiogram is performed. The study quality is good. Images are obtained  from parasternal, apical, subcostal and suprasternal notch views. ECG tracing  shows regular rhythm.     Segmental Anatomy:  There is normal atrial arrangement, with concordant atrioventricular and  ventriculoarterial connections.     Systemic and pulmonary veins:  The systemic venous return is normal. Normal coronary sinus. Color flow  demonstrates flow from two pulmonary veins entering the left atrium.     Atria and atrial septum:  Normal right atrial size. The left atrium is normal in size. There is no  atrial level shunting.        Atrioventricular valves:  The tricuspid valve is normal in appearance and motion. Trivial tricuspid  valve insufficiency. Estimated right ventricular systolic pressure is 18.2  mmHg plus right atrial pressure. The mitral valve is normal in appearance and  motion. There is no mitral valve insufficiency.     Ventricles and Ventricular Septum:  The left and right ventricles have normal chamber size, wall thickness, and  systolic function. The calculated biplane left ventricular ejection fraction  is 63 %. The calculated  single plane left ventricular ejection fraction from  the 4 chamber view is 59 %. The calculated single plane left ventricular  ejection fraction from the 2 chamber view is 57 %. There is no ventricular  level shunting.     Outflow tracts:  Normal great artery relationship. There is unobstructed flow through the right  ventricular outflow tract. The pulmonary valve motion is normal. There is  normal flow across the pulmonary valve. Trivial pulmonary valve insufficiency.  There is unobstructed flow through the left ventricular outflow tract.  Tricuspid aortic valve with normal appearance and motion. There is normal flow  across the aortic valve.     Great arteries:  The main pulmonary artery has normal appearance. There is unobstructed flow in  the main pulmonary artery. The pulmonary artery bifurcation is normal. RPA  appears normal, the main and left PA are not seen well. Normal ascending  aorta. The aortic root at the sinus of Valsalva, sinotubular ridge and  proximal ascending aorta are normal. The aortic arch appears normal. There is  unobstructed antegrade flow in the ascending, transverse arch, descending  thoracic and abdominal aorta.     Arterial Shunts:  There is no arterial level shunting.     Coronaries:  Normal origin of the right and left proximal coronary arteries from the  corresponding sinus of Valsalva by 2D. There is normal flow pattern in the  left and right coronaries by color Doppler.        Effusions, catheters, cannulas and leads:  No pericardial effusion.     MMode/2D Measurements & Calculations  LA dimension: 3.2 cm          Ao Arch Diam (Proximal trans.): 2.6 cm                                Ao root diam: 2.8 cm  LA/Ao: 1.1                    2 Chamber EF: 67.0 %  4 Chamber EF: 59.0 %          EF Biplane: 63.0 %  LVMI(BSA): 76.7 grams/m2      LVMI(Height): 30.6     RWT(MM): 0.25     Doppler Measurements & Calculations  MV E max teri: 88.8 cm/sec               Ao V2 max: 88.4 cm/sec  MV A  max teri: 38.0 cm/sec               Ao max PG: 3.1 mmHg  MV E/A: 2.3  LV V1 max: 92.8 cm/sec                  PA V2 max: 114.0 cm/sec  LV V1 max PG: 3.4 mmHg                  PA max P.2 mmHg  RV V1 max: 46.4 cm/sec                  TR max teri: 213.3 cm/sec  RV V1 max P.86 mmHg                 TR max P.2 mmHg     asc Ao max teri: 91.8 cm/sec           desc Ao max teri: 109.1 cm/sec  asc Ao max PG: 3.4 mmHg               desc Ao max P.8 mmHg  MPA max teri: 125.9 cm/sec  MPA max P.3 mmHg     [place] 2D Z-SCORE VALUES  Measurement Name Value Z-ScorePredictedNormal Range  Ao sinus diam(2D)3.3 cm1.2    2.9      2.3 - 3.5  Ao ST Jx Diam(2D)2.4 cm-0.29  2.5      1.9 - 3.0  AoV mayda diam(2D)2.0 cm-0.61  2.2      1.7 - 2.6  asc Aorta(2D)    2.7 cm0.27   2.6      2.0 - 3.2  RPA diam(2D)     2.0 cm1.8    1.5      1.1 - 2.0     [address] Z-Scores (Measurements & Calculations)  Measurement NameValue      Z-ScorePredictedNormal Range  IVSd(MM)        0.86 cm    -0.85  0.99     0.69 - 1.29  LVIDd(MM)       5.4 cm     0.73   5.1      4.4 - 5.9  LVIDs(MM)       3.2 cm     -0.28  3.3      2.6 - 4.0  LVPWd(MM)       0.66 cm    -2.1   0.93     0.68 - 1.18  LV mass(C)d(MM) 145.2 grams-1.0   177.7    120.2 - 262.7  FS(MM)          40.5 %     1.5    34.9     28.6 - 42.6           Report approved by: Poppy Aparicio 2020 11:13 AM            Results for orders placed or performed in visit on 20   EKG 12-lead, tracing only (Same Day)     Status: None   Result Value Ref Range    Interpretation ECG Click View Image link to view waveform and result    Results for orders placed or performed in visit on 20   Lipid panel reflex to direct LDL Fasting     Status: None   Result Value Ref Range    Cholesterol 162 <170 mg/dL    Triglycerides 74 <90 mg/dL    HDL Cholesterol 53 >45 mg/dL    LDL Cholesterol Calculated 94 <110 mg/dL    Non HDL Cholesterol 109 <120 mg/dL         In summary, Evin is a 19 year old with a  history of aortic dilation. He has a normal echocardiogram today and normal chest CT in July. He does complain of exercise intolerance and racing heart rate, along with labile BP on home monitoring. His symptoms may represent some autonomic dysfunction. Will readdress poss connective tissue disorders and genetic or vascular screening at next visit.     Recommendations:  Increase non-caffeinated  fluid intake-  80 ounces daily  Increase regular exercise, both moderate aerobic exercise and light weightlifting  Will do one week zio patch Holter and ambulatory BP monitoring.   Discussed limitations and variability in home monitoring.   No heavy weight lifting recommended. No valsalva-  Should be able to talk and breathe during weight lifting  Referral to neurology for chronic migraine and thalamic lesion  Obtain echo and CTA images and reports from [facility].     Follow up December or January when home from school with CPX (stress test) and review of symptoms.       Thank you for the opportunity to meet Bozenas and his mother today. Please do not hesitate to call with questions or concerns.      Sincerely,    Jeremy Cottrell M.D.   of Pediatrics  Pediatric and Adult Congenital Cardiology  [facility]  Pediatric Cardiology Office [phone]  Adult Congenital Cardiology Triage and Scheduling [phone]      CC:  Evin Mendoza

## 2020-09-14 ENCOUNTER — HOSPITAL ENCOUNTER (OUTPATIENT)
Dept: MRI IMAGING | Facility: CLINIC | Age: 20
Discharge: HOME | End: 2020-09-14
Attending: PSYCHIATRY & NEUROLOGY

## 2020-09-14 DIAGNOSIS — G43.809 VESTIBULAR MIGRAINE: ICD-10-CM

## 2020-09-14 DIAGNOSIS — G43.109 MIGRAINE WITH AURA, NOT INTRACTABLE, WITHOUT STATUS MIGRAINOSUS: ICD-10-CM

## 2020-10-09 ENCOUNTER — CARE COORDINATION (OUTPATIENT)
Dept: CARDIOLOGY | Facility: CLINIC | Age: 20
End: 2020-10-09

## 2020-10-09 NOTE — PROGRESS NOTES
Called Evin to discuss the results of your ambulatory blood pressure monitor and Zio monitor.  Per Dr Cottrell, can follow up in December with a CPX if still feels like he is having some exercise intolerance otherwise can see him Summer 2021 with an echo.  Provided call back number to discuss.    Clemente Powers, RN  Cardiology RN Care Coordinator  [phone]

## 2020-10-12 ENCOUNTER — OFFICE VISIT (OUTPATIENT)
Dept: NEUROLOGY | Facility: CLINIC | Age: 20
End: 2020-10-12
Payer: COMMERCIAL

## 2020-10-12 ENCOUNTER — RECORDS - HEALTHEAST (OUTPATIENT)
Dept: ADMINISTRATIVE | Facility: OTHER | Age: 20
End: 2020-10-12

## 2020-10-12 ENCOUNTER — RECORDS - HEALTHEAST (OUTPATIENT)
Dept: LAB | Facility: HOSPITAL | Age: 20
End: 2020-10-12

## 2020-10-12 VITALS
BODY MASS INDEX: 22.62 KG/M2 | DIASTOLIC BLOOD PRESSURE: 76 MMHG | HEART RATE: 62 BPM | HEIGHT: 72 IN | SYSTOLIC BLOOD PRESSURE: 136 MMHG | WEIGHT: 167 LBS

## 2020-10-12 DIAGNOSIS — G43.809 VESTIBULAR MIGRAINE: Primary | ICD-10-CM

## 2020-10-12 LAB
ALBUMIN SERPL-MCNC: 4.2 G/DL (ref 3.5–5)
ALP SERPL-CCNC: 71 U/L (ref 45–120)
ALT SERPL W P-5'-P-CCNC: 17 U/L (ref 0–45)
AST SERPL W P-5'-P-CCNC: 16 U/L (ref 0–40)
BILIRUB DIRECT SERPL-MCNC: 0.1 MG/DL
BILIRUB SERPL-MCNC: 0.3 MG/DL (ref 0–1)
PROT SERPL-MCNC: 7 G/DL (ref 6–8)
VALPROATE SERPL-MCNC: 21.3 UG/ML (ref 50–150)

## 2020-10-12 PROCEDURE — 99214 OFFICE O/P EST MOD 30 MIN: CPT | Performed by: PSYCHIATRY & NEUROLOGY

## 2020-10-12 ASSESSMENT — MIFFLIN-ST. JEOR: SCORE: 1805.51

## 2020-10-12 NOTE — PROGRESS NOTES
NEUROLOGY FOLLOW UP VISIT  NOTE       [facility] NEUROLOGY [place]  [address] [place] [address]  Tel: [phone]  Fax: [phone]  [email or web address]     Evin Mendoza,  2000, MRN [id number]  PCP: No primary care provider on file., None  Date: 10/12/2020     ASSESSMENT & PLAN     Diagnosis code: Vestibular migraine     Vestibular migraine  Pleasant 19-year-old male with history of pulmonary valve stenosis, borderline dilatation of ascending aorta, hyperextensibility, right cerebellopontine angle lipoma, left posterior thalamic indeterminate lesion who was referred for evaluation of progressively worsening headaches associated with vertigo.    I suspected patient had vestibular migraine and started him on Depakote.  He does admit that symptoms have improved but also reports that he has made changes in his lifestyle (stopped drinking pop, regular sleep).  He also had MRA of the head that was unremarkable.  I have asked him to continue on Depakote ER and use Maxalt as needed.  I am checking valproic acid level, hepatic profile and vitamin D.  Regular follow-up will be in 6 months     Hyperextensibility, right CP angle lipoma & left posterior thalamic indeterminate lesion  Patient is being followed at [facility] for right CP angle lipoma and left posterior thalamic indeterminate lesion that has remained stable over the years.  He was also evaluated by ENT and was noted to have right sensorineural hearing loss and genetic testing was offered for possible Marfan syndrome, Tracie-Danlos syndrome.  Initially patient refused but he is now interested in getting that test.  As there is a family history of brain aneurysm rupture and with his history of hyperextensibility, possible Marfan syndrome, Tracie-Danlos syndrome I recommended checking MRI of the head that was unremarkable with no evidence of intra-cranial aneurysm.  I told him no further intervention is needed and he should  continue following up with [facility]    Thank you again for this referral, please feel free to contact me if you have any questions.    Lew Fitzpatrick MD  [facility]  (Formerly, [facility]     HISTORY OF PRESENT ILLNESS     Patient is a pleasant 19-year-old male with history of pulmonary valve stenosis, borderline dilatation of the ascending aorta, hyperextensibility, right cerebellar pontine angle lipoma with sensorineural hearing loss and left posterior thalamic indeterminant lesion who was last seen on 8/21/2020 for headaches that started in 2015.  According to patient he usually gets these headaches that are preceded by difficulty concentrating, vertigo and pressure-like feeling that will emanate from the back of his neck to the front and is associated with nausea and light sensitivity.  He has noticed that change in weather tends to make the symptoms worse.  These headaches progressively got worse and he was seen at urgent care for symptomatic treatment..  In the past he was tried on Depakote that did seem to help and he weaned himself off Depakote.  He cannot identify any triggering events.  He recently had a MRI of the head that showed no acute intracranial process but there was asymmetric nonenhancing T2 flair hyperintensity in the left posterior thalamus and a small 6 x 3 mm lipoma in the right cerebellopontine angle cistern.  These abnormalities have been present for some time and he is being followed at [facility] and looking back up to 2016 there is no significant change.     Patient was evaluated at [facility] in the past for right-sided hearing loss, migraine headaches and MRI abnormality.  As noted above it showed a left thalamic lesion and right cerebellopontine angle abnormalities that were followed over time and remained stable.  There was no mass-effect and as it has not changed over time it is quite reassuring.  Patient has  hypermobility and echocardiogram abnormalities and was evaluated by genetics and cardiology and question of Marfan syndrome or Tracie-Danlos syndrome was raised.  Although genetic testing was offered they decided against it but now willing to pursue it.  As noted above he also has sensorineural hearing loss on the right side.  Given there is increased risk of intracranial aneurysm, MRA was done that was unremarkable     PROBLEM LIST   Patient Active Problem List   Diagnosis Code     Ascending aorta dilatation (H) I77.810     Benign lipomatous tumor D17.9     Dilatation of pulmonic artery (H) I28.8     Disorder of connective tissue (H) M35.9     Migraine headache G43.909     Sensorineural hearing loss H90.5         PAST MEDICAL & SURGICAL HISTORY     Past Medical History:   Patient  has no past medical history on file.    Surgical History:  He  has a past surgical history that includes tonsillectomy.     SOCIAL HISTORY     Reviewed, and he  reports that he has never smoked. He has never used smokeless tobacco. He reports that he does not drink alcohol.     FAMILY HISTORY     Reviewed, and family history includes Cerebral aneurysm in an other family member; Depression in his maternal grandmother; Hyperlipidemia in his mother; Hypertension in his maternal grandfather, mother, and paternal grandfather; Migraines in his father; Myocardial Infarction in his paternal grandfather; Osteoporosis in his maternal grandmother.     ALLERGIES     Allergies   Allergen Reactions     Amoxicillin Hives     Oxycodone Other (See Comments) and Swelling     Other reaction(s): facial swelling  Face swelling       Penicillins Hives, Rash and Unknown     Other reaction(s): hive         REVIEW OF SYSTEMS     A 12 point review of system was performed and was negative except as outlined in the history of present illness.     HOME MEDICATIONS       Current Outpatient Medications:      divalproex sodium extended-release (DEPAKOTE ER) 500 MG 24 hr  tablet, Take 1 tablet (500 mg) by mouth daily, Disp: 30 tablet, Rfl: 11     ondansetron (ZOFRAN-ODT) 4 MG ODT tab, Take 4 mg by mouth, Disp: , Rfl:      rizatriptan (MAXALT-MLT) 10 MG ODT, Take 1 tablet (10 mg) by mouth at onset of headache for migraine (May repaet x 1 in 2 hours) May repeat in 2 hours. Max 3 tablets/24 hours., Disp: 18 tablet, Rfl: 11      PHYSICAL EXAM     Vital signs  There were no vitals taken for this visit.    Weight:   0 lbs 0 oz    GENERAL PHYSICAL EXAM: Patient is alert and oriented x 4 in no acute distress. Neck was supple, no carotid bruits, thyromegaly, lymphadenopathy or JVD noted.   NEUROLOGICAL EXAM:  Mental Status  Patient is A&O x 4. Patient recalls 3/3 objects at 5 minutes.  Speech  Speech is clear and fluent with good repetition, comprehension, and naming both for objects and parts of an object. Written and verbal comprehension is intact.  Cranial Nerves  CN II: Visual fields are full to confrontation. Fundoscopic exam is normal with sharp discs and no vascular changes. Venous pulsations are present bilaterally. Pupils are equal and reactive to light.   CN III, IV, VI: EOMI, PERRLA  CN V: Facial sensation is intact to pinprick in all 3 divisions bilaterally. Corneal responses are intact.  CN VII: Face is symmetric with normal eye closure and smile.  CN VII: Hearing is normal to rubbing fingers  CN IX, X: Palate elevates symmetrically. Phonation is normal.  CN XI: Head turning and shoulder shrug are intact  CN XII: Tongue is midline with normal movements and no atrophy.  Motor Exam  Muscle bulk and tone are normal. No pronator drift. Strength is 5/5 bilaterally. No fasciculations noted.  Reflexes  Reflexes are 2+ and symmetric at the biceps, triceps, knees, and ankles. Plantar responses are flexor.  Sensory Exam  Light touch, pinprick, position sense, and vibration sense are intact bilaterally. No astereognosia, agraphesthesia or extinction to bilateral simultaneous  stimulation.  Coordination  Rapid alternating movements and fine finger movements are intact. No dysmetria on FNF and HKS. Romberg negative.  Gait  Posture is normal.Tandem gait is normal. Able to walk on toes and heels.     DIAGNOSTIC STUDIES     PERTINENT RADIOLOGY  Following imaging studies were reviewed:     MRA BRAIN 9/14/2020  Normal head MRA without evidence of cerebral aneurysm.    MRI BRAIN 8/3/20  1.  No acute intracranial process.  2.  Asymmetric nonenhancing T2 FLAIR hyperintensity left posterior thalamus.  3.  Small 6 x 3 mm lipoma right cerebellopontine angle cistern.     MRI HEAD 10/3/18  Stable mild enlargement and ill-defined T2 FLAIR hyperintensity within the  dorsal left thalamus. No associated enhancement or restricted diffusion. Stable  3 x 7 mm nonenhancing T1 hyperintensity within the inferior right cerebellar  pontine angle, likely representing a small lipoma. Previous mucosal thickening  in the paranasal sinuses has near completely resolved. The remainder of the  examination is unremarkable.     MRI BRAIN 4/12/17  No significant interval change. Compared to prior examination, there has been no significant interval change. Again seen is a tiny focus of increased T2 signal without enhancement or restricted diffusion within the dorsal left thalamus. No associated mass effect. Stable appearance to the previously seen tiny approximately 6 x 2 mm right inferior cerebellopontine angle lipoma, again without mass effect. Mild mucosal thickening in the paranasal sinuses. Remainder negative.     MRI BRAIN 4/21/16   No significant change since the prior study. Again seen is a small subtle area of signal abnormality in the dorsal left thalamus without associated pathologic enhancement. Stable small right inferior cerebellopontine angle lipoma (series 500, image 16). Mucous retention cyst right maxillary sinus. Remainder negative.      PERTINENT LABS  Following labs were reviewed:  Office Visit on  08/11/2020   Component Date Value     Interpretation ECG 08/11/2020 Click View Image link to view waveform and result    Orders Only on 08/11/2020   Component Date Value     Cholesterol 08/11/2020 162      Triglycerides 08/11/2020 74      HDL Cholesterol 08/11/2020 53      LDL Cholesterol Calculat* 08/11/2020 94      Non HDL Cholesterol 08/11/2020 109          Total time spent for face to face visit, reviewing labs/imaging studies, counseling and coordination of care was: 30 Minutes More than 50% of this time was spent on counseling and coordination of care.      This note was dictated using voice recognition software.  Any grammatical or context distortions are unintentional and inherent to the software.

## 2020-10-12 NOTE — NURSING NOTE
Chief Complaint   Patient presents with     Follow Up     vestibular Migranes- F/u on MRA- less oftern, less frequent, still HA's, unknown trigger     In clinic, with mother.  Jaylin Chen, ATC

## 2020-10-12 NOTE — LETTER
10/12/2020         RE: Evin Mendoza  [address] [facility] [address]        Dear Colleague,    Thank you for referring your patient, Evin Mendoza, to the [facility] [place]. Please see a copy of my visit note below.    NEUROLOGY FOLLOW UP VISIT  NOTE       [facility] NEUROLOGY [place]  [address] [place] [address]  Tel: [phone]  Fax: [phone]  [email or web address]     Evin Mendoza,  2000, MRN [id number]  PCP: No primary care provider on file., None  Date: 10/12/2020     ASSESSMENT & PLAN     Diagnosis code: Vestibular migraine     Vestibular migraine  Pleasant 19-year-old male with history of pulmonary valve stenosis, borderline dilatation of ascending aorta, hyperextensibility, right cerebellopontine angle lipoma, left posterior thalamic indeterminate lesion who was referred for evaluation of progressively worsening headaches associated with vertigo.    I suspected patient had vestibular migraine and started him on Depakote.  He does admit that symptoms have improved but also reports that he has made changes in his lifestyle (stopped drinking pop, regular sleep).  He also had MRA of the head that was unremarkable.  I have asked him to continue on Depakote ER and use Maxalt as needed.  I am checking valproic acid level, hepatic profile and vitamin D.  Regular follow-up will be in 6 months     Hyperextensibility, right CP angle lipoma & left posterior thalamic indeterminate lesion  Patient is being followed at [facility] for right CP angle lipoma and left posterior thalamic indeterminate lesion that has remained stable over the years.  He was also evaluated by ENT and was noted to have right sensorineural hearing loss and genetic testing was offered for possible Marfan syndrome, Tracie-Danlos syndrome.  Initially patient refused but he is now interested in getting that test.  As there is a family history of brain  aneurysm rupture and with his history of hyperextensibility, possible Marfan syndrome, Tracie-Danlos syndrome I recommended checking MRI of the head that was unremarkable with no evidence of intra-cranial aneurysm.  I told him no further intervention is needed and he should continue following up with [facility]    Thank you again for this referral, please feel free to contact me if you have any questions.    Lew Fitzpatrick MD  [facility]  (Formerly, [facility]     HISTORY OF PRESENT ILLNESS     Patient is a pleasant 19-year-old male with history of pulmonary valve stenosis, borderline dilatation of the ascending aorta, hyperextensibility, right cerebellar pontine angle lipoma with sensorineural hearing loss and left posterior thalamic indeterminant lesion who was last seen on 8/21/2020 for headaches that started in 2015.  According to patient he usually gets these headaches that are preceded by difficulty concentrating, vertigo and pressure-like feeling that will emanate from the back of his neck to the front and is associated with nausea and light sensitivity.  He has noticed that change in weather tends to make the symptoms worse.  These headaches progressively got worse and he was seen at urgent care for symptomatic treatment..  In the past he was tried on Depakote that did seem to help and he weaned himself off Depakote.  He cannot identify any triggering events.  He recently had a MRI of the head that showed no acute intracranial process but there was asymmetric nonenhancing T2 flair hyperintensity in the left posterior thalamus and a small 6 x 3 mm lipoma in the right cerebellopontine angle cistern.  These abnormalities have been present for some time and he is being followed at [facility] and looking back up to 2016 there is no significant change.     Patient was evaluated at [facility] in the past for right-sided hearing loss, migraine headaches  and MRI abnormality.  As noted above it showed a left thalamic lesion and right cerebellopontine angle abnormalities that were followed over time and remained stable.  There was no mass-effect and as it has not changed over time it is quite reassuring.  Patient has hypermobility and echocardiogram abnormalities and was evaluated by genetics and cardiology and question of Marfan syndrome or Tracie-Danlos syndrome was raised.  Although genetic testing was offered they decided against it but now willing to pursue it.  As noted above he also has sensorineural hearing loss on the right side.  Given there is increased risk of intracranial aneurysm, MRA was done that was unremarkable     PROBLEM LIST   Patient Active Problem List   Diagnosis Code     Ascending aorta dilatation (H) I77.810     Benign lipomatous tumor D17.9     Dilatation of pulmonic artery (H) I28.8     Disorder of connective tissue (H) M35.9     Migraine headache G43.909     Sensorineural hearing loss H90.5         PAST MEDICAL & SURGICAL HISTORY     Past Medical History:   Patient  has no past medical history on file.    Surgical History:  He  has a past surgical history that includes tonsillectomy.     SOCIAL HISTORY     Reviewed, and he  reports that he has never smoked. He has never used smokeless tobacco. He reports that he does not drink alcohol.     FAMILY HISTORY     Reviewed, and family history includes Cerebral aneurysm in an other family member; Depression in his maternal grandmother; Hyperlipidemia in his mother; Hypertension in his maternal grandfather, mother, and paternal grandfather; Migraines in his father; Myocardial Infarction in his paternal grandfather; Osteoporosis in his maternal grandmother.     ALLERGIES     Allergies   Allergen Reactions     Amoxicillin Hives     Oxycodone Other (See Comments) and Swelling     Other reaction(s): facial swelling  Face swelling       Penicillins Hives, Rash and Unknown     Other reaction(s): hive          REVIEW OF SYSTEMS     A 12 point review of system was performed and was negative except as outlined in the history of present illness.     HOME MEDICATIONS       Current Outpatient Medications:      divalproex sodium extended-release (DEPAKOTE ER) 500 MG 24 hr tablet, Take 1 tablet (500 mg) by mouth daily, Disp: 30 tablet, Rfl: 11     ondansetron (ZOFRAN-ODT) 4 MG ODT tab, Take 4 mg by mouth, Disp: , Rfl:      rizatriptan (MAXALT-MLT) 10 MG ODT, Take 1 tablet (10 mg) by mouth at onset of headache for migraine (May repaet x 1 in 2 hours) May repeat in 2 hours. Max 3 tablets/24 hours., Disp: 18 tablet, Rfl: 11      PHYSICAL EXAM     Vital signs  There were no vitals taken for this visit.    Weight:   0 lbs 0 oz    GENERAL PHYSICAL EXAM: Patient is alert and oriented x 4 in no acute distress. Neck was supple, no carotid bruits, thyromegaly, lymphadenopathy or JVD noted.   NEUROLOGICAL EXAM:  Mental Status  Patient is A&O x 4. Patient recalls 3/3 objects at 5 minutes.  Speech  Speech is clear and fluent with good repetition, comprehension, and naming both for objects and parts of an object. Written and verbal comprehension is intact.  Cranial Nerves  CN II: Visual fields are full to confrontation. Fundoscopic exam is normal with sharp discs and no vascular changes. Venous pulsations are present bilaterally. Pupils are equal and reactive to light.   CN III, IV, VI: EOMI, PERRLA  CN V: Facial sensation is intact to pinprick in all 3 divisions bilaterally. Corneal responses are intact.  CN VII: Face is symmetric with normal eye closure and smile.  CN VII: Hearing is normal to rubbing fingers  CN IX, X: Palate elevates symmetrically. Phonation is normal.  CN XI: Head turning and shoulder shrug are intact  CN XII: Tongue is midline with normal movements and no atrophy.  Motor Exam  Muscle bulk and tone are normal. No pronator drift. Strength is 5/5 bilaterally. No fasciculations noted.  Reflexes  Reflexes are 2+ and  symmetric at the biceps, triceps, knees, and ankles. Plantar responses are flexor.  Sensory Exam  Light touch, pinprick, position sense, and vibration sense are intact bilaterally. No astereognosia, agraphesthesia or extinction to bilateral simultaneous stimulation.  Coordination  Rapid alternating movements and fine finger movements are intact. No dysmetria on FNF and HKS. Romberg negative.  Gait  Posture is normal.Tandem gait is normal. Able to walk on toes and heels.     DIAGNOSTIC STUDIES     PERTINENT RADIOLOGY  Following imaging studies were reviewed:     MRA BRAIN 9/14/2020  Normal head MRA without evidence of cerebral aneurysm.    MRI BRAIN 8/3/20  1.  No acute intracranial process.  2.  Asymmetric nonenhancing T2 FLAIR hyperintensity left posterior thalamus.  3.  Small 6 x 3 mm lipoma right cerebellopontine angle cistern.     MRI HEAD 10/3/18  Stable mild enlargement and ill-defined T2 FLAIR hyperintensity within the  dorsal left thalamus. No associated enhancement or restricted diffusion. Stable  3 x 7 mm nonenhancing T1 hyperintensity within the inferior right cerebellar  pontine angle, likely representing a small lipoma. Previous mucosal thickening  in the paranasal sinuses has near completely resolved. The remainder of the  examination is unremarkable.     MRI BRAIN 4/12/17  No significant interval change. Compared to prior examination, there has been no significant interval change. Again seen is a tiny focus of increased T2 signal without enhancement or restricted diffusion within the dorsal left thalamus. No associated mass effect. Stable appearance to the previously seen tiny approximately 6 x 2 mm right inferior cerebellopontine angle lipoma, again without mass effect. Mild mucosal thickening in the paranasal sinuses. Remainder negative.     MRI BRAIN 4/21/16   No significant change since the prior study. Again seen is a small subtle area of signal abnormality in the dorsal left thalamus without  associated pathologic enhancement. Stable small right inferior cerebellopontine angle lipoma (series 500, image 16). Mucous retention cyst right maxillary sinus. Remainder negative.      PERTINENT LABS  Following labs were reviewed:  Office Visit on 08/11/2020   Component Date Value     Interpretation ECG 08/11/2020 Click View Image link to view waveform and result    Orders Only on 08/11/2020   Component Date Value     Cholesterol 08/11/2020 162      Triglycerides 08/11/2020 74      HDL Cholesterol 08/11/2020 53      LDL Cholesterol Calculat* 08/11/2020 94      Non HDL Cholesterol 08/11/2020 109          Total time spent for face to face visit, reviewing labs/imaging studies, counseling and coordination of care was: 30 Minutes More than 50% of this time was spent on counseling and coordination of care.      This note was dictated using voice recognition software.  Any grammatical or context distortions are unintentional and inherent to the software.             Again, thank you for allowing me to participate in the care of your patient.        Sincerely,        Lew Fitzpatrick MD

## 2020-10-13 LAB — 25(OH)D3 SERPL-MCNC: 45.3 NG/ML (ref 30–80)

## 2020-12-10 ENCOUNTER — CARE COORDINATION (OUTPATIENT)
Dept: CARDIOLOGY | Facility: CLINIC | Age: 20
End: 2020-12-10

## 2020-12-10 DIAGNOSIS — I37.0 NONRHEUMATIC PULMONARY VALVE STENOSIS: Primary | ICD-10-CM

## 2020-12-10 DIAGNOSIS — I77.810 DILATED AORTIC ROOT (H): ICD-10-CM

## 2020-12-10 NOTE — PROGRESS NOTES
Called and spoke to Evin to review results of his ambulatory blood pressure monitor and Zio monitor.  Evin states that he is feeling well, per Dr Cottrell ok to defer follow up to Summer 2021 with an echo.  Evin states he understands information provided and will call with further questions or concerns.    Clemente Powers, RN  Cardiology RN Care Coordinator  [phone]

## 2021-01-04 ENCOUNTER — HEALTH MAINTENANCE LETTER (OUTPATIENT)
Age: 21
End: 2021-01-04

## 2021-04-14 ENCOUNTER — RECORDS - HEALTHEAST (OUTPATIENT)
Dept: ADMINISTRATIVE | Facility: OTHER | Age: 21
End: 2021-04-14

## 2021-04-14 ENCOUNTER — AMBULATORY - HEALTHEAST (OUTPATIENT)
Dept: LAB | Facility: HOSPITAL | Age: 21
End: 2021-04-14

## 2021-04-14 ENCOUNTER — OFFICE VISIT (OUTPATIENT)
Dept: NEUROLOGY | Facility: CLINIC | Age: 21
End: 2021-04-14
Payer: COMMERCIAL

## 2021-04-14 VITALS
DIASTOLIC BLOOD PRESSURE: 79 MMHG | WEIGHT: 170 LBS | SYSTOLIC BLOOD PRESSURE: 129 MMHG | BODY MASS INDEX: 23.03 KG/M2 | HEART RATE: 57 BPM | HEIGHT: 72 IN

## 2021-04-14 DIAGNOSIS — G43.809 VESTIBULAR MIGRAINE: ICD-10-CM

## 2021-04-14 DIAGNOSIS — G43.109 CLASSICAL MIGRAINE: ICD-10-CM

## 2021-04-14 PROBLEM — G93.9 LESION OF BRAIN: Status: ACTIVE | Noted: 2018-11-08

## 2021-04-14 LAB
ALBUMIN SERPL-MCNC: 4.3 G/DL (ref 3.5–5)
ALP SERPL-CCNC: 76 U/L (ref 45–120)
ALT SERPL W P-5'-P-CCNC: 13 U/L (ref 0–45)
AST SERPL W P-5'-P-CCNC: 16 U/L (ref 0–40)
BILIRUB DIRECT SERPL-MCNC: 0.1 MG/DL
BILIRUB SERPL-MCNC: 0.4 MG/DL (ref 0–1)
PROT SERPL-MCNC: 7.3 G/DL (ref 6–8)

## 2021-04-14 PROCEDURE — 99214 OFFICE O/P EST MOD 30 MIN: CPT | Performed by: PSYCHIATRY & NEUROLOGY

## 2021-04-14 RX ORDER — DIVALPROEX SODIUM 500 MG/1
1000 TABLET, EXTENDED RELEASE ORAL DAILY
Qty: 60 TABLET | Refills: 11 | Status: SHIPPED | OUTPATIENT
Start: 2021-04-14 | End: 2021-07-30

## 2021-04-14 ASSESSMENT — MIFFLIN-ST. JEOR: SCORE: 1819.11

## 2021-04-14 NOTE — NURSING NOTE
Chief Complaint   Patient presents with     Headache     6 month follow up- still having HAs but not as intense      Justine Padron CMA on 4/14/2021 at 2:31 PM

## 2021-04-14 NOTE — PROGRESS NOTES
NEUROLOGY FOLLOW UP VISIT  NOTE       [facility] NEUROLOGY [place]  [address] [place] [address]  Tel: [phone]  Fax: [phone]  [email or web address]     Evin Mendoza,  2000, MRN [id number]  PCP: No primary care provider on file., None  Date: 2021     ASSESSMENT & PLAN     Diagnosis code: Vestibular migraine     Vestibular migraine  Pleasant 20-year-old male with history of pulmonary valve stenosis, borderline dilatation of ascending aorta, hyperextensibility, right CP angle lipoma, left posterior thalamic indeterminate lesion was been followed in our clinic for vestibular migraine.  After he was started on Depakote he did notice improvement in his symptoms but still gets occasional discomfort which is not as severe.  For abortive treatment he is using rizatriptan.  I have increase the dose of Depakote to 1000 mg at bedtime.  I am checking hepatic profile.  Follow-up will be in 6 months     Hyperextensibility, right CP angle lipoma & left posterior thalamic indeterminate lesion  Patient is being followed at [facility] for right CP angle lipoma and left posterior thalamic indeterminate lesion that has remained stable over the years.  He was also evaluated by ENT and was noted to have right sensorineural hearing loss and genetic testing was offered for possible Marfan syndrome, Tracie-Danlos syndrome.  Initially patient refused and although past he was interested again he has finally decided he is going to be more academic and does not want to do that..  As there is a family history of brain aneurysm rupture and with his history of hyperextensibility, possible Marfan syndrome, Tracie-Danlos syndrome I had recommended checking MRI of the head that was unremarkable with no evidence of intra-cranial aneurysm.  I told him no further intervention is needed and he should continue following up with [facility]    Thank you again for this referral, please feel free to contact  me if you have any questions.    Lew Fitzpatrick MD  [facility]  (Former[facility], [facility]     HISTORY OF PRESENT ILLNESS     Patient is a pleasant 20-year-old male with history of pulmonary valve stenosis, borderline dilatation of the ascending aorta, hyperextensibility, right cerebellar pontine angle lipoma with sensorineural hearing loss and left posterior thalamic indeterminant lesion who was last seen on 10/12/2020 for headaches that started in 2015.  According to patient he usually gets these headaches that are preceded by difficulty concentrating, vertigo and pressure-like feeling that will emanate from the back of his neck to the front and is associated with nausea and light sensitivity.  He has noticed that change in weather tends to make the symptoms worse.   He  had a MRI of the head that showed no acute intracranial process but there was asymmetric nonenhancing T2 flair hyperintensity in the left posterior thalamus and a small 6 x 3 mm lipoma in the right cerebellopontine angle cistern.  These abnormalities have been present for some time and he is being followed at [facility] and looking back up to 2016 there is no significant change.I suspected patient had vestibular migraine and started him on Depakote that did seem to help his headache but patient also reported that he change his lifestyle and stopped drinking pop along with regular sleep.  He had a MRI of the head that was unremarkable.  He had some lab work done that included a normal hepatic profile and his valproic acid level was low at 21.3     Patient was evaluated at [facility] in the past for right-sided hearing loss, migraine headaches and MRI abnormality.  As noted above it showed a left thalamic lesion and right cerebellopontine angle abnormalities that were followed over time and remained stable.  There was no mass-effect and as it has not changed over time it is quite reassuring.   Patient has hypermobility and echocardiogram abnormalities and was evaluated by genetics and cardiology and question of Marfan syndrome or Tracie-Danlos syndrome was raised.  Although genetic testing was offered they decided against it but when I last saw him he was willing to pursue it t.  As noted above he also has sensorineural hearing loss on the right side.  Given there is increased risk of intracranial aneurysm, MRA was done that was unremarkable     PROBLEM LIST   Patient Active Problem List   Diagnosis Code     Ascending aorta dilatation (H) I77.810     Benign lipomatous tumor D17.9     Dilatation of pulmonic artery (H) I28.8     Disorder of connective tissue (H) M35.9     Vestibular migraine G43.809     Sensorineural hearing loss H90.5     Lesion of brain G93.9         PAST MEDICAL & SURGICAL HISTORY     Past Medical History:   Patient  has no past medical history on file.    Surgical History:  He  has a past surgical history that includes tonsillectomy.     SOCIAL HISTORY     Reviewed, and he  reports that he has never smoked. He has never used smokeless tobacco. He reports that he does not drink alcohol or use drugs.     FAMILY HISTORY     Reviewed, and family history includes Cerebral aneurysm in an other family member; Depression in his maternal grandmother; Hyperlipidemia in his mother; Hypertension in his maternal grandfather, mother, and paternal grandfather; Migraines in his father; Myocardial Infarction in his paternal grandfather; Osteoporosis in his maternal grandmother.     ALLERGIES     Allergies   Allergen Reactions     Amoxicillin Hives     Oxycodone Other (See Comments) and Swelling     Other reaction(s): facial swelling  Face swelling       Penicillins Hives, Rash and Unknown     Other reaction(s): hive         REVIEW OF SYSTEMS     A 12 point review of system was performed and was negative except as outlined in the history of present illness.     HOME MEDICATIONS     Current Outpatient Rx    Medication Sig Dispense Refill     divalproex sodium extended-release (DEPAKOTE ER) 500 MG 24 hr tablet Take 2 tablets (1,000 mg) by mouth daily 60 tablet 11     rizatriptan (MAXALT-MLT) 10 MG ODT Take 1 tablet (10 mg) by mouth at onset of headache for migraine (May repaet x 1 in 2 hours) May repeat in 2 hours. Max 3 tablets/24 hours. 18 tablet 11        PHYSICAL EXAM     Vital signs  /79 (BP Location: Right arm, Patient Position: Sitting)   Pulse 57   Ht 1.829 m (6')   Wt 77.1 kg (170 lb)   BMI 23.06 kg/m      Weight:   170 lbs 0 oz    GENERAL PHYSICAL EXAM: Patient is alert and oriented x 4 in no acute distress. Neck was supple, no carotid bruits, thyromegaly, lymphadenopathy or JVD noted.   NEUROLOGICAL EXAM:  Mental Status  Patient is A&O x 4. Patient recalls 3/3 objects at 5 minutes.  Speech  Speech is clear and fluent with good repetition, comprehension, and naming both for objects and parts of an object. Written and verbal comprehension is intact.  Cranial Nerves  CN II: Visual fields are full to confrontation. Fundoscopic exam is normal with sharp discs and no vascular changes. Venous pulsations are present bilaterally. Pupils are equal and reactive to light.   CN III, IV, VI: EOMI, PERRLA  CN V: Facial sensation is intact to pinprick in all 3 divisions bilaterally. Corneal responses are intact.  CN VII: Face is symmetric with normal eye closure and smile.  CN VII: Hearing is normal to rubbing fingers  CN IX, X: Palate elevates symmetrically. Phonation is normal.  CN XI: Head turning and shoulder shrug are intact  CN XII: Tongue is midline with normal movements and no atrophy.  Motor Exam  Muscle bulk and tone are normal. No pronator drift. Strength is 5/5 bilaterally. No fasciculations noted.  Reflexes  Reflexes are 2+ and symmetric at the biceps, triceps, knees, and ankles. Plantar responses are flexor.  Sensory Exam  Light touch, pinprick, position sense, and vibration sense are intact  bilaterally. No astereognosia, agraphesthesia or extinction to bilateral simultaneous stimulation.  Coordination  Rapid alternating movements and fine finger movements are intact. No dysmetria on FNF and HKS. Romberg negative.  Gait  Posture is normal.Tandem gait is normal. Able to walk on toes and heels.     DIAGNOSTIC STUDIES     PERTINENT RADIOLOGY  Following imaging studies were reviewed:     MRA BRAIN 9/14/2020  Normal head MRA without evidence of cerebral aneurysm.    MRI BRAIN 8/3/20  1.  No acute intracranial process.  2.  Asymmetric nonenhancing T2 FLAIR hyperintensity left posterior thalamus.  3.  Small 6 x 3 mm lipoma right cerebellopontine angle cistern.     MRI HEAD 10/3/18  Stable mild enlargement and ill-defined T2 FLAIR hyperintensity within the  dorsal left thalamus. No associated enhancement or restricted diffusion. Stable  3 x 7 mm nonenhancing T1 hyperintensity within the inferior right cerebellar  pontine angle, likely representing a small lipoma. Previous mucosal thickening  in the paranasal sinuses has near completely resolved. The remainder of the  examination is unremarkable.     MRI BRAIN 4/12/17  No significant interval change. Compared to prior examination, there has been no significant interval change. Again seen is a tiny focus of increased T2 signal without enhancement or restricted diffusion within the dorsal left thalamus. No associated mass effect. Stable appearance to the previously seen tiny approximately 6 x 2 mm right inferior cerebellopontine angle lipoma, again without mass effect. Mild mucosal thickening in the paranasal sinuses. Remainder negative.     MRI BRAIN 4/21/16   No significant change since the prior study. Again seen is a small subtle area of signal abnormality in the dorsal left thalamus without associated pathologic enhancement. Stable small right inferior cerebellopontine angle lipoma (series 500, image 16). Mucous retention cyst right maxillary sinus. Remainder  negative.      PERTINENT LABS  Following labs were reviewed:   Valproic Acid (Depakene )Resulted: 10/12/2020 11:21 AM  [facility]  Component Name Value Ref Range   Valproic Acid 21.3 (L)           Total time spent for face to face visit, reviewing labs/imaging studies, counseling and coordination of care was: 30 Minutes More than 50% of this time was spent on counseling and coordination of care.      This note was dictated using voice recognition software.  Any grammatical or context distortions are unintentional and inherent to the software.

## 2021-04-14 NOTE — LETTER
2021         RE: Evin Mendoza  [address] [id number]        Dear Colleague,    Thank you for referring your patient, Evin Mendoza, to the [facility] NEUROLOGY CLINIC [place]. Please see a copy of my visit note below.    NEUROLOGY FOLLOW UP VISIT  NOTE       [facility] NEUROLOGY [place]  [address] [place] [address]  Tel: [phone]  Fax: [phone]  [email or web address]     Evin Mendoza,  2000, MRN [id number]  PCP: No primary care provider on file., None  Date: 2021     ASSESSMENT & PLAN     Diagnosis code: Vestibular migraine     Vestibular migraine  Pleasant 20-year-old male with history of pulmonary valve stenosis, borderline dilatation of ascending aorta, hyperextensibility, right CP angle lipoma, left posterior thalamic indeterminate lesion was been followed in our clinic for vestibular migraine.  After he was started on Depakote he did notice improvement in his symptoms but still gets occasional discomfort which is not as severe.  For abortive treatment he is using rizatriptan.  I have increase the dose of Depakote to 1000 mg at bedtime.  I am checking hepatic profile.  Follow-up will be in 6 months     Hyperextensibility, right CP angle lipoma & left posterior thalamic indeterminate lesion  Patient is being followed at [facility] for right CP angle lipoma and left posterior thalamic indeterminate lesion that has remained stable over the years.  He was also evaluated by ENT and was noted to have right sensorineural hearing loss and genetic testing was offered for possible Marfan syndrome, Tracie-Danlos syndrome.  Initially patient refused and although past he was interested again he has finally decided he is going to be more academic and does not want to do that..  As there is a family history of brain aneurysm rupture and with his history of hyperextensibility, possible Marfan syndrome, Tracie-Danlos  syndrome I had recommended checking MRI of the head that was unremarkable with no evidence of intra-cranial aneurysm.  I told him no further intervention is needed and he should continue following up with [facility]    Thank you again for this referral, please feel free to contact me if you have any questions.    Lew Fitzpatrick MD  [facility]  (Formerly, [facility]     HISTORY OF PRESENT ILLNESS     Patient is a pleasant 20-year-old male with history of pulmonary valve stenosis, borderline dilatation of the ascending aorta, hyperextensibility, right cerebellar pontine angle lipoma with sensorineural hearing loss and left posterior thalamic indeterminant lesion who was last seen on 10/12/2020 for headaches that started in 2015.  According to patient he usually gets these headaches that are preceded by difficulty concentrating, vertigo and pressure-like feeling that will emanate from the back of his neck to the front and is associated with nausea and light sensitivity.  He has noticed that change in weather tends to make the symptoms worse.   He  had a MRI of the head that showed no acute intracranial process but there was asymmetric nonenhancing T2 flair hyperintensity in the left posterior thalamus and a small 6 x 3 mm lipoma in the right cerebellopontine angle cistern.  These abnormalities have been present for some time and he is being followed at [facility] and looking back up to 2016 there is no significant change.I suspected patient had vestibular migraine and started him on Depakote that did seem to help his headache but patient also reported that he change his lifestyle and stopped drinking pop along with regular sleep.  He had a MRI of the head that was unremarkable.  He had some lab work done that included a normal hepatic profile and his valproic acid level was low at 21.3     Patient was evaluated at [facility] in the past for right-sided  hearing loss, migraine headaches and MRI abnormality.  As noted above it showed a left thalamic lesion and right cerebellopontine angle abnormalities that were followed over time and remained stable.  There was no mass-effect and as it has not changed over time it is quite reassuring.  Patient has hypermobility and echocardiogram abnormalities and was evaluated by genetics and cardiology and question of Marfan syndrome or Tracie-Danlos syndrome was raised.  Although genetic testing was offered they decided against it but when I last saw him he was willing to pursue it t.  As noted above he also has sensorineural hearing loss on the right side.  Given there is increased risk of intracranial aneurysm, MRA was done that was unremarkable     PROBLEM LIST   Patient Active Problem List   Diagnosis Code     Ascending aorta dilatation (H) I77.810     Benign lipomatous tumor D17.9     Dilatation of pulmonic artery (H) I28.8     Disorder of connective tissue (H) M35.9     Vestibular migraine G43.809     Sensorineural hearing loss H90.5     Lesion of brain G93.9         PAST MEDICAL & SURGICAL HISTORY     Past Medical History:   Patient  has no past medical history on file.    Surgical History:  He  has a past surgical history that includes tonsillectomy.     SOCIAL HISTORY     Reviewed, and he  reports that he has never smoked. He has never used smokeless tobacco. He reports that he does not drink alcohol or use drugs.     FAMILY HISTORY     Reviewed, and family history includes Cerebral aneurysm in an other family member; Depression in his maternal grandmother; Hyperlipidemia in his mother; Hypertension in his maternal grandfather, mother, and paternal grandfather; Migraines in his father; Myocardial Infarction in his paternal grandfather; Osteoporosis in his maternal grandmother.     ALLERGIES     Allergies   Allergen Reactions     Amoxicillin Hives     Oxycodone Other (See Comments) and Swelling     Other reaction(s):  facial swelling  Face swelling       Penicillins Hives, Rash and Unknown     Other reaction(s): hive         REVIEW OF SYSTEMS     A 12 point review of system was performed and was negative except as outlined in the history of present illness.     HOME MEDICATIONS     Current Outpatient Rx   Medication Sig Dispense Refill     divalproex sodium extended-release (DEPAKOTE ER) 500 MG 24 hr tablet Take 2 tablets (1,000 mg) by mouth daily 60 tablet 11     rizatriptan (MAXALT-MLT) 10 MG ODT Take 1 tablet (10 mg) by mouth at onset of headache for migraine (May repaet x 1 in 2 hours) May repeat in 2 hours. Max 3 tablets/24 hours. 18 tablet 11        PHYSICAL EXAM     Vital signs  /79 (BP Location: Right arm, Patient Position: Sitting)   Pulse 57   Ht 1.829 m (6')   Wt 77.1 kg (170 lb)   BMI 23.06 kg/m      Weight:   170 lbs 0 oz    GENERAL PHYSICAL EXAM: Patient is alert and oriented x 4 in no acute distress. Neck was supple, no carotid bruits, thyromegaly, lymphadenopathy or JVD noted.   NEUROLOGICAL EXAM:  Mental Status  Patient is A&O x 4. Patient recalls 3/3 objects at 5 minutes.  Speech  Speech is clear and fluent with good repetition, comprehension, and naming both for objects and parts of an object. Written and verbal comprehension is intact.  Cranial Nerves  CN II: Visual fields are full to confrontation. Fundoscopic exam is normal with sharp discs and no vascular changes. Venous pulsations are present bilaterally. Pupils are equal and reactive to light.   CN III, IV, VI: EOMI, PERRLA  CN V: Facial sensation is intact to pinprick in all 3 divisions bilaterally. Corneal responses are intact.  CN VII: Face is symmetric with normal eye closure and smile.  CN VII: Hearing is normal to rubbing fingers  CN IX, X: Palate elevates symmetrically. Phonation is normal.  CN XI: Head turning and shoulder shrug are intact  CN XII: Tongue is midline with normal movements and no atrophy.  Motor Exam  Muscle bulk and tone  are normal. No pronator drift. Strength is 5/5 bilaterally. No fasciculations noted.  Reflexes  Reflexes are 2+ and symmetric at the biceps, triceps, knees, and ankles. Plantar responses are flexor.  Sensory Exam  Light touch, pinprick, position sense, and vibration sense are intact bilaterally. No astereognosia, agraphesthesia or extinction to bilateral simultaneous stimulation.  Coordination  Rapid alternating movements and fine finger movements are intact. No dysmetria on FNF and HKS. Romberg negative.  Gait  Posture is normal.Tandem gait is normal. Able to walk on toes and heels.     DIAGNOSTIC STUDIES     PERTINENT RADIOLOGY  Following imaging studies were reviewed:     MRA BRAIN 9/14/2020  Normal head MRA without evidence of cerebral aneurysm.    MRI BRAIN 8/3/20  1.  No acute intracranial process.  2.  Asymmetric nonenhancing T2 FLAIR hyperintensity left posterior thalamus.  3.  Small 6 x 3 mm lipoma right cerebellopontine angle cistern.     MRI HEAD 10/3/18  Stable mild enlargement and ill-defined T2 FLAIR hyperintensity within the  dorsal left thalamus. No associated enhancement or restricted diffusion. Stable  3 x 7 mm nonenhancing T1 hyperintensity within the inferior right cerebellar  pontine angle, likely representing a small lipoma. Previous mucosal thickening  in the paranasal sinuses has near completely resolved. The remainder of the  examination is unremarkable.     MRI BRAIN 4/12/17  No significant interval change. Compared to prior examination, there has been no significant interval change. Again seen is a tiny focus of increased T2 signal without enhancement or restricted diffusion within the dorsal left thalamus. No associated mass effect. Stable appearance to the previously seen tiny approximately 6 x 2 mm right inferior cerebellopontine angle lipoma, again without mass effect. Mild mucosal thickening in the paranasal sinuses. Remainder negative.     MRI BRAIN 4/21/16   No significant change  since the prior study. Again seen is a small subtle area of signal abnormality in the dorsal left thalamus without associated pathologic enhancement. Stable small right inferior cerebellopontine angle lipoma (series 500, image 16). Mucous retention cyst right maxillary sinus. Remainder negative.      PERTINENT LABS  Following labs were reviewed:   Valproic Acid (Depakene )Resulted: 10/12/2020 11:21 AM  [facility]  Component Name Value Ref Range   Valproic Acid 21.3 (L)           Total time spent for face to face visit, reviewing labs/imaging studies, counseling and coordination of care was: 30 Minutes More than 50% of this time was spent on counseling and coordination of care.      This note was dictated using voice recognition software.  Any grammatical or context distortions are unintentional and inherent to the software.             Again, thank you for allowing me to participate in the care of your patient.        Sincerely,        Lew Fitzpatrick MD

## 2021-05-26 ENCOUNTER — RECORDS - HEALTHEAST (OUTPATIENT)
Dept: ADMINISTRATIVE | Facility: CLINIC | Age: 21
End: 2021-05-26

## 2021-06-04 VITALS
DIASTOLIC BLOOD PRESSURE: 70 MMHG | SYSTOLIC BLOOD PRESSURE: 112 MMHG | TEMPERATURE: 98 F | BODY MASS INDEX: 22.41 KG/M2 | HEIGHT: 71 IN | WEIGHT: 160.1 LBS | HEART RATE: 64 BPM | OXYGEN SATURATION: 97 %

## 2021-06-09 NOTE — PROGRESS NOTES
Chart Reviewed by Clinical Product Navigator; patient on payer plan coverage, identified on recently discharged list, no PCP listed.   Upon review, patient has appointment to establish care on 7/24/20, no referrals or outreach initiated at this time.   CPN will monitor for future needs.     Ligia Hamilton RN  Clinical Product Navigator

## 2021-06-09 NOTE — PATIENT INSTRUCTIONS - HE
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  He actually has been doing very well for the last 2 years, but his symptoms changed about 2 weeks ago, with more severe or more frequent headaches, accompanied by symptoms of fatigue, trouble concentrating, nausea.  Additional history is that he has hypermobile joints, and was being evaluated at [facility] for possibility of collagen disease like Tracie Danlos syndrome, although that is not yet been definitively diagnosed.    Because he is had a disturbing evolution of his symptoms in the last week, I will go ahead and order a brain MRI, which will need to be compared to previous images from [place], most recently October 3, 2018.  He is supposed to undergo consultation at [facility] Departments of neurology and cardiology.  Cardiology is because of history of a mildly enlarged a sending aorta (described further below).  He has had 2 recent visits to the emergency department on July 20 and July 15, 2020, and underwent a comprehensive set of laboratory work which came back entirely normal.    Going issue by issue:    More frequent headaches which have now become intractable, with features that seem migrainous, with visual disturbances.  Location of the headache is over the occiput, duration is variable, sometimes a few minutes, sometimes up to an entire day.  He recalled a severe episode July 12, when he was on a boat excursion.  He became intensely nauseated, had a lie down.  Had headache  at the same time.  In the past week, he has had a headache every day, at least 2 or 3 a day, duration typically a few minutes.  He reports sleep is okay.  He abstains from caffeine.  He is not exhibited any focal neurologic deficit.  Speech, coordination, motor strength unaffected.  Mental status has been unaffected.  No fever or chills.  No symptoms to suggest COVID-19 infection.  He was tested for COVID in the emergency department.    His headache symptomatology dates back to when he was in middle school.  He was treated with Depakote as migraine suppression for a couple years, but is been off of it for several years.  When he gets a headache, he tries ibuprofen and acetaminophen that they do not help.    I told him that it is possible that he has a distinct migraine headache problem, that may or may not be related to the MRI findings.  But obviously he needs a complete restaging of his neurological status.  Thus we will get things rolling with an MRI now, and that information will be available for review when he meets with a neurologist.  For now, but hold off on putting him back on Depakote.  He told me he gets relief by going into a quiet dark area and lying down, and that is fine for now.    For nausea he uses as needed ondansetron.    If the MRI does not reveal anything new, and if neurologists evaluation does not bring any new diagnoses onto the table, we might end up pursuing a traditional strategy for management of chronic migraines.  That might include a anticonvulsant such as topiramate or divalproex.  It might involve use of a beta-blocker, although I would not want to suppress his athletic performance.  For abortive therapy, perhaps the triptans could be considered.    Abnormal brain MRI, most recently imaged October 3, 2018 at [facility].  Left thalamic lesion and right cerebellopontine angle abnormality.  The CPA abnormality is thought to represent a lipoma.  Note from [facility] describing the  left thalamus a tiny focus of increased T2 signal that is nonenhancing and with no associated restricted diffusion and no apparent mass-effect.  Is described as being stable over serial imaging.  In the right internal auditory canal there is a small area of signal abnormality that is described as consistent with a lipoma at the right inferior cerebellopontine angle.    Initial MRI was done November 2014.    Right-sided low-frequency hearing loss  He has had ENT and audiology evaluations at [facility], most recently October 3, 2018 with Dr. Wilner Ramirez.  Is reported as having low-frequency sensorineural hearing loss on the right.  Normal on the left.    Joint hypermobility and mild ascending aorta enlargement, question of collagen vascular  disease.  [facility] notes say that a genetic evaluation occurred at [facility] August 2015.  Genetic testing was recommended for Marfan syndrome, Tracie-Danlos syndrome, and Loeys Isidra syndrome.    On physical exam today, his heart sounds entirely normal.  I do not hear any murmurs.  Echocardiogram from [facility] March 20, 2018 describes normal left ventricular size and function.  Normal right ventricular size and function.  Borderline a sending aorta diameter (32 mm) remainder of visualized aorta segments appeared normal.  No evidence of coarctation.    CT angiography done as part of his emergency department evaluation on July 15 reported no significant aortic aneurysmal dilatation.  Borderline ectasia ascending aorta.    History of right pelvis fracture senior year in high school from a downhill skiing accident, but he made a good recovery.    I am not setting a fixed follow-up date for next appointment.  Rather he and I will stay in touch via My Chart, and I expect to be receiving reports of his testing and consultations, and we can arrange our next meeting at an appropriate time.

## 2021-06-09 NOTE — PROGRESS NOTES
Office Visit - New Patient   Evin Mendoza   19 y.o.  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  He actually has been doing very well for the last 2 years, but his symptoms changed about 2 weeks ago, with more severe or more frequent headaches, accompanied by symptoms of fatigue, trouble concentrating, nausea.  Additional history is that he has hypermobile joints, and was being evaluated at [facility] for possibility of collagen disease like Tracie Danlos syndrome, although that is not yet been definitively diagnosed.    Because he is had a disturbing evolution of his symptoms in the last week, I will go ahead and order a brain MRI, which will need to be compared to previous images from [place], most recently October 3, 2018.  He is supposed to undergo consultation at [facility] Departments of neurology and cardiology.  Cardiology is because of history of a mildly enlarged a sending aorta (described further below).  He has had 2 recent visits to the emergency department on July 20 and July 15, 2020, and underwent a comprehensive set of laboratory work which came back entirely normal.    Going issue by issue:    More frequent headaches which have now become intractable, with features that seem migrainous, with visual disturbances.  Location of the headache is over the occiput, duration is variable, sometimes a few minutes, sometimes up to  an entire day.  He recalled a severe episode July 12, when he was on a boat excursion.  He became intensely nauseated, had a lie down.  Had headache at the same time.  In the past week, he has had a headache every day, at least 2 or 3 a day, duration typically a few minutes.  He reports sleep is okay.  He abstains from caffeine.  He is not exhibited any focal neurologic deficit.  Speech, coordination, motor strength unaffected.  Mental status has been unaffected.  No fever or chills.  No symptoms to suggest COVID-19 infection.  He was tested for COVID in the emergency department.    His headache symptomatology dates back to when he was in middle school.  He was treated with Depakote as migraine suppression for a couple years, but is been off of it for several years.  When he gets a headache, he tries ibuprofen and acetaminophen that they do not help.    I told him that it is possible that he has a distinct migraine headache problem, that may or may not be related to the MRI findings.  But obviously he needs a complete restaging of his neurological status.  Thus we will get things rolling with an MRI now, and that information will be available for review when he meets with a neurologist.  For now, but hold off on putting him back on Depakote.  He told me he gets relief by going into a quiet dark area and lying down, and that is fine for now.    For nausea he uses as needed ondansetron.    If the MRI does not reveal anything new, and if neurologists evaluation does not bring any new diagnoses onto the table, we might end up pursuing a traditional strategy for management of chronic migraines.  That might include a anticonvulsant such as topiramate or divalproex.  It might involve use of a beta-blocker, although I would not want to suppress his athletic performance.  For abortive therapy, perhaps the triptans could be considered.    Abnormal brain MRI, most recently imaged October 3, 2018 at [facility].  Left thalamic  lesion and right cerebellopontine angle abnormality.  The CPA abnormality is thought to represent a lipoma.  Note from [facility] describing the left thalamus a tiny focus of increased T2 signal that is nonenhancing and with no associated restricted diffusion and no apparent mass-effect.  Is described as being stable over serial imaging.  In the right internal auditory canal there is a small area of signal abnormality that is described as consistent with a lipoma at the right inferior cerebellopontine angle.    Initial MRI was done November 2014.    Right-sided low-frequency hearing loss  He has had ENT and audiology evaluations at [facility], most recently October 3, 2018 with Dr. Wilner Ramirez.  Is reported as having low-frequency sensorineural hearing loss on the right.  Normal on the left.    Joint hypermobility and mild ascending aorta enlargement, question of collagen vascular  disease.  [facility] notes say that a genetic evaluation occurred at [facility] August 2015.  Genetic testing was recommended for Marfan syndrome, Tracie-Danlos syndrome, and Loeys Isidra syndrome.    On physical exam today, his heart sounds entirely normal.  I do not hear any murmurs.  Echocardiogram from [facility] March 20, 2018 describes normal left ventricular size and function.  Normal right ventricular size and function.  Borderline a sending aorta diameter (32 mm) remainder of visualized aorta segments appeared normal.  No evidence of coarctation.    CT angiography done as part of his emergency department evaluation on July 15 reported no significant aortic aneurysmal dilatation.  Borderline ectasia ascending aorta.    History of right pelvis fracture senior year in high school from a downhill skiing accident, but he made a good recovery.    I am not setting a fixed follow-up date for next appointment.  Rather he and I will stay in touch via My Chart, and I expect to be receiving reports of his testing and consultations, and we  can arrange our next meeting at an appropriate time.       Chief Complaint   Chief Complaint   Patient presents with     [facility]     Hospital Visit Follow Up     Migraine        History of Present Illness   This 19 y.o. 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  He actually has been doing very well for the last 2 years, but his symptoms changed about 2 weeks ago, with more severe or more frequent headaches, accompanied by symptoms of fatigue, trouble concentrating, nausea.  Additional history is that he has hypermobile joints, and was being evaluated at [facility] for possibility of collagen disease like Tracie Danlos syndrome, although that is not yet been definitively diagnosed.    He brought about 100 pages of printed records from [facility] which I reviewed in front of him.    More frequent headaches which have now become intractable, with features that seem migrainous, with visual disturbances.  Location of the headache is over the occiput, duration is variable, sometimes a few minutes, sometimes up to an entire day.  He recalled a severe episode July 12, when he was on a boat excursion.  He became intensely nauseated, had a lie down.  Had headache at the same time.  In the past week, he has had a headache every day, at least 2 or 3 a day, duration typically a few minutes.  He reports sleep is okay.  He abstains from caffeine.  He is not exhibited any focal neurologic deficit.  Speech, coordination, motor strength unaffected.   "Mental status has been unaffected.  No fever or chills.  No symptoms to suggest COVID-19 infection.  He was tested for COVID in the emergency department.    His headache symptomatology dates back to when he was in middle school.  He was treated with Depakote as migraine suppression for a couple years, but is been off of it for several years.  When he gets a headache, he tries ibuprofen and acetaminophen that they do not help.      EVALUATION DATE & TIME: 7/20/2020 12:26 PM  FINAL IMPRESSION:  1. Fatigue, unspecified type   2. Generalized weakness   3. Chronic intractable headache, unspecified headache type   4. Nausea   \"... 19 y.o. male presents to the Emergency Department for evaluation of generalized weakness and fatigue.  He is well-appearing and vitally stable when he arrives to the emergency department.  His neurologic and cardiopulmonary exams are normal.  Reviewed his visit from last week where he had normal CT head performed as well as generally unremarkable basic labs.  Repeated some of his work-up including chemistry panel which showed resolution of his previous hypokalemia and hypomagnesemia.  Broaden his work-up to include TSH, Monospot, which were abnormal.  I reviewed some of his extensive [facility] records and I do not think that any of his previously noted comorbidities are likely contributing to his symptoms today.  Unclear etiology for his chronic fatigue, headaches, nausea, but do agree with the provider who evaluated him last week but he likely has some component of poorly controlled chronic migraine headaches.  He was given IV fluids and we discussed management of his headache patterns at home.  Of particular concern, outpatient provider had wanted him screened for COVID prior to establishing care in the clinics here so did perform this today and he was given isolation return precautions specifically related to COVID-19 testing.  He was otherwise stable and was comfortable with the plan for " "discharge home.  Was given scripts for Zofran and Reglan to manage his nausea and headaches at home in addition to Tylenol and ibuprofen.  We reviewed follow-up plan and return precautions prior to discharge.\"    7-15-20   \"19 y.o. male presents to the Emergency Department for evaluation of headache lightheadedness and feeling out of it.  May be an atypical migraine.  Patient has a complex history including congenital heart disease and known pituitary gland tumor.  Neurologic exam is normal.  No vertigo.  I do not think this represents an acute posterior stroke.  CT scan of the head is negative.  No signs intercranial hemorrhage.  Does have known aortic dilatation therefore did get a CT scan of the chest.  No signs of dissection or worsening aortic dilatation.  EKG i shows prolonged QTC..  No previous is available.  Magnesium and potassium mildly low which were replaced here.  White count mildly elevated.  No other signs of infection.  Troponin and thyroid are normal.  Patient feeling better here for IV Reglan and IV fluids.  Will discharge home and have him follow-up with his primary addition to with the cardiologist.  Will return for worsening symptoms.\"    Wt Readings from Last 3 Encounters:   07/24/20 160 lb 1.6 oz (72.6 kg) (58 %, Z= 0.19)*   07/20/20 160 lb (72.6 kg) (58 %, Z= 0.19)*   07/15/20 160 lb (72.6 kg) (58 %, Z= 0.19)*     * Growth percentiles are based on [facility] (Boys, 2-20 Years) data.     BP Readings from Last 3 Encounters:   07/24/20 112/70   07/20/20 127/71   07/15/20 116/59     Lab Results   Component Value Date    WBC 5.5 07/20/2020    HGB 15.9 07/20/2020    HCT 46.0 07/20/2020     07/20/2020    ALT 14 07/15/2020    AST 17 07/15/2020     07/20/2020    K 4.4 07/20/2020     07/20/2020    CREATININE 1.00 07/20/2020    BUN 8 07/20/2020    CO2 26 07/20/2020    TSH 1.64 07/20/2020       Review of Systems: A comprehensive review of systems was negative except as noted.     Medications " "and Allergies   Current Outpatient Medications   Medication Sig Dispense Refill     metoclopramide (REGLAN) 10 MG tablet Take 1 tablet (10 mg total) by mouth 4 (four) times a day as needed for nausea (use after zofran). 20 tablet 0     ondansetron (ZOFRAN-ODT) 4 MG disintegrating tablet Take 1 tablet (4 mg total) by mouth every 8 (eight) hours as needed. 20 tablet 0     No current facility-administered medications for this visit.      Allergies   Allergen Reactions     Amoxicillin Hives     Oxycodone Swelling     Penicillins Hives        Active Ambulatory Problems     Diagnosis Date Noted     No Active Ambulatory Problems     Resolved Ambulatory Problems     Diagnosis Date Noted     No Resolved Ambulatory Problems     Past Medical History:   Diagnosis Date     Brain lesion      Heart murmur      Low iron      Migraines      Past Surgical History:   Procedure Laterality Date     CYST REMOVAL       MA FIX COLLAT LIG,MC-P JT,I-P JT Left 4/11/2016    Procedure: LEFT THUMB METACARPOPHALANGEAL JOINT ULNAR COLLATERAL LIGAMENT REPAIR WITH RECONSTRUCTION;  Surgeon: Chase Lainez MD;  Location: [facility];  Service: Orthopedics     TONSILLECTOMY       TYMPANOSTOMY TUBE PLACEMENT        Past Medical History:   Diagnosis Date     Brain lesion     x2     Heart murmur      Low iron      Migraines         Family and Social History   Family History   Problem Relation Age of Onset     Heart attack Paternal Grandfather      Bone cancer Maternal Uncle         Knee        Social History     Tobacco Use     Smoking status: Never Smoker     Smokeless tobacco: Never Used   Substance Use Topics     Alcohol use: No     Drug use: No        Physical Exam   General Appearance: Appears well, athletic appearing, breathing comfortably, smooth gait    /70 (Patient Site: Right Arm, Patient Position: Sitting, Cuff Size: Adult Large)   Pulse 64   Temp 98  F (36.7  C) (Oral)   Ht 5' 11\" (1.803 m)   Wt 160 lb 1.6 oz (72.6 kg)  "  SpO2 97%   BMI 22.33 kg/m      General: Alert, in no distress  Skin: No significant lesion seen.  Eyes/nose/throat: Eyes without scleral icterus, eye movements normal, pupils equal and reactive, oropharynx clear, ears with normal TM's  MSK: Neck with good ROM  Lymphatic: Neck without adenopathy or masses  Endocrine: Thyroid with no nodules to palpation  Pulm: Lungs clear to auscultation bilaterally  Cardiac: Heart with regular rate and rhythm, no murmur or gallop  GI: Abdomen soft, nontender. No palpable enlargement of liver or spleen  MSK: Extremities no tenderness or edema  Neuro: Moves all extremities, without focal weakness  Normal finger-to-nose testing.  Negative Romberg.  Facial movement symmetrical  Psych: Alert, normal mental status. Normal affect and speech         Additional Information        Leon Booth MD  Internal Medicine

## 2021-06-09 NOTE — TELEPHONE ENCOUNTER
Patient's mother calls today in tears concerned about the patient. She is not listed under consent to communicate in the chart. I asked her if she is with the patient and if he could talk. She says no she is not with him but is worried about his health.    The patient's mother states he has been very sick for over a week. Last week on 7/15/20 he became extremely weak and sick; had headaches, dizziness, and nausea to the point where he couldn't even stand. Was brought to [facility] ED at that time. CT scan, EKG and blood work was negative. Patient given IV fluids and IV reglan. Discharged with instructions to follow-up with PCP and cardiologist.    Patient's symptoms have not improved. He had Evisit on 7/17/20 and had COVID test ordered. Has not been tested yet, his COVID test is scheduled for today.     His mother states she is extremely worried because he's been in bed all weekend. Can barely get up and walk around. She is extremely concerned for his health. I recommended that she take him back to the ER. His symptoms sound very serious at this point and he may need further work-up. Patient's mother declines to take him to ER because he was already seen last week. Asks for office visit.     I explained to patient's mother that since patient is having possible COVID symptoms an office visit cannot be offered at this time. Furthermore patient's symptoms seem quite worrisome and ER would offer more prompt assessment. The ER could do more in depth workup on patient than a regular clinic could. The ER is the safest place for him to go. Patient's mother still asks for office visit today. I told her I would check with scheduling to see if this would be possible, but re-iterated that they may not set up office visit d/t COVID concerns. After connecting with scheduling I tried to pull the patient's mother back on the line but the call had disconnected. I attempted to call her back but the line is busy. I will try again in a  few minutes.    10:35am: was able to reach patient's mother again. I explained that scheduling could not schedule him for office visit d/t COVID symptoms and recommended ER again. Patient's mother verbalizes understanding and is going to take the patient in to the ER again this morning.    Arlin Michael RN

## 2021-06-09 NOTE — TELEPHONE ENCOUNTER
"RN Triage  Evin was seen in ER at  on 7/15 with symptoms of shortness of breath, dizziness, chest tightness, headache. He had a number of tests at that time but no COVID test was ever done.  Still feeling tired, achy, chest tightness, headache.  Not feeling worse. Dizziness is better. He has history of heart condition and brain tumor. Feeling better overall but still having significant symptoms. Shortness of breath only occurs when having severe nausea. No fevers but feeling chilled. Dizziness right now is \"minor but always present.\"  PCP at [facility]- however last 4 doctors are now retired.     Mom is very concerned that this could possibly be COVID and that Evin wasn't tested. Interested in what other options for testing are available.    I recommended telephone urgent care for Evin to evaluate his symptoms again. They are agreeable to this plan.    Nicole Maxwell RN  [facility] Nurse Advisor      Reason for Disposition    Chest pain or pressure    Additional Information    Negative: SEVERE difficulty breathing (e.g., struggling for each breath, speaks in single words)    Negative: Difficult to awaken or acting confused (e.g., disoriented, slurred speech)    Negative: Bluish (or gray) lips or face now    Negative: Shock suspected (e.g., cold/pale/clammy skin, too weak to stand, low BP, rapid pulse)    Negative: Sounds like a life-threatening emergency to the triager    Negative: SEVERE or constant chest pain or pressure (Exception: mild central chest pain, present only when coughing)    Negative: MODERATE difficulty breathing (e.g., speaks in phrases, SOB even at rest, pulse 100-120)    Protocols used: CORONAVIRUS (COVID-19) DIAGNOSED OR [id number]-[id number] 5.16.20    COVID 19 Nurse Triage Plan/Patient Instructions    Please be aware that novel coronavirus (COVID-19) may be circulating in the community. If you develop symptoms such as fever, cough, or SOB or if you have concerns about the presence of another " infection including coronavirus (COVID-19), please contact your health care provider or visit [email or web address].     Disposition/Instructions    Virtual Visit with provider recommended. Reference Visit Selection Guide.    Thank you for taking steps to prevent the spread of this virus.  o Limit your contact with others.  o Wear a simple mask to cover your cough.  o Wash your hands well and often.    Resources    [facility]: About COVID-19: [email or web address]    CDC: What to Do If You're Sick: www.cdc.gov/coronavirus/2019-ncov/about/steps-when-sick.html    CDC: Ending Home Isolation: www.cdc.gov/coronavirus/2019-ncov/hcp/disposition-in-home-patients.html     CDC: Caring for Someone: www.cdc.gov/coronavirus/2019-ncov/if-you-are-sick/care-for-someone.html     [facility]: Interim Guidance for Hospital Discharge to Home: www.[facility].[place].us/diseases/coronavirus/hcp/hospdischarge.pdf    [facility] clinical trials (COVID-19 research studies): clinicalaffairs.[facility]-clinical-trials     Below are the COVID-19 hotlines at the [place] Department of Health ([facility]). Interpreters are available.   o For health questions: Call 125-346-6959 or 1-913.459.9647 (7 a.m. to 7 p.m.)  o For questions about schools and childcare: Call 502-425-5175 or 1-335.667.8588 (7 a.m. to 7 p.m.)

## 2021-06-09 NOTE — TELEPHONE ENCOUNTER
Reason for Disposition    Patient sounds very sick or weak to the triager    Protocols used: CORONAVIRUS (COVID-19) DIAGNOSED OR [id number] 5.16.20

## 2021-06-20 NOTE — LETTER
Letter by Justine Foely RN at      Author: Justine Foley RN Service: -- Author Type: --    Filed:  Encounter Date: 7/23/2020 Status: (Other)       7/23/2020        Evin Mendoza  [address] [id number]    This letter provides a written record that you were tested for COVID-19 on 7/20/2020.     Your result was negative. This means that we didnt find the virus that causes COVID-19 in your sample. A test may show negative when you do actually have the virus. This can happen when the virus is in the early stages of infection, before you feel illness symptoms.    If you have symptoms   Stay home and away from others (self-isolate) until you meet ALL of the guidelines below:    Youve had no fever--and no medicine that reduces fever--for 3 full days (72 hours). And ?    Your other symptoms have gotten better. For example, your cough or breathing has improved. And?    At least 10 days have passed since your symptoms started.    During this time:    Stay home. Dont go to work, school or anywhere else.     Stay in your own room, including for meals. Use your own bathroom if you can.    Stay away from others in your home. No hugging, kissing or shaking hands. No visitors.    Clean high touch surfaces often (doorknobs, counters, handles, etc.). Use a household cleaning spray or wipes. You can find a full list on the EPA website at www.epa.gov/pesticide-registration/list-n-disinfectants-use-against-sars-cov-2.    Cover your mouth and nose with a mask, tissue or washcloth to avoid spreading germs.    Wash your hands and face often with soap and water.    Going back to work  Check with your employer for any guidelines to follow for going back to work.    Employers: This document serves as formal notice that your employee tested negative for COVID-19, as of the testing date shown above.

## 2021-07-29 DIAGNOSIS — G43.809 VESTIBULAR MIGRAINE: ICD-10-CM

## 2021-07-29 NOTE — LETTER
7/29/2021        RE: Evin Penaloza Kelly  [address] [id number]        We recently provided you with medication refills.  Many medications require routine follow-up with your doctor.  Dr. Fitzpatrick at last visit recommended follow up in 6 months ( October 2021 )    Your prescription(s) have been refilled for 30 days with 2 refills so you may have time for the above noted follow-up. Please call to schedule soon so we can assure you have an appointment before your next refills are needed. If you have already made a follow up appointment, please disregard this letter.         Sincerely,        [facility]     (Formerly known as [facility])

## 2021-07-30 RX ORDER — DIVALPROEX SODIUM 500 MG/1
TABLET, EXTENDED RELEASE ORAL
Qty: 60 TABLET | Refills: 2 | Status: SHIPPED | OUTPATIENT
Start: 2021-07-30 | End: 2022-11-09

## 2021-07-30 NOTE — TELEPHONE ENCOUNTER
Divalproex sodium extended release refill request for Evin whose last visit was 4- and plan to follow up in 6 months. Appointment reminder letter mailed to patient.  Medication T'd for review and signature    Jennifer Christianson RN on 7/30/2021 at 8:58 AM

## 2021-10-10 ENCOUNTER — HEALTH MAINTENANCE LETTER (OUTPATIENT)
Age: 21
End: 2021-10-10

## 2022-01-30 ENCOUNTER — HEALTH MAINTENANCE LETTER (OUTPATIENT)
Age: 22
End: 2022-01-30

## 2022-09-24 ENCOUNTER — HEALTH MAINTENANCE LETTER (OUTPATIENT)
Age: 22
End: 2022-09-24

## 2022-11-09 ENCOUNTER — OFFICE VISIT (OUTPATIENT)
Dept: INTERNAL MEDICINE | Facility: CLINIC | Age: 22
End: 2022-11-09
Payer: COMMERCIAL

## 2022-11-09 VITALS
SYSTOLIC BLOOD PRESSURE: 118 MMHG | TEMPERATURE: 99.1 F | HEIGHT: 71 IN | OXYGEN SATURATION: 99 % | DIASTOLIC BLOOD PRESSURE: 72 MMHG | BODY MASS INDEX: 22.08 KG/M2 | WEIGHT: 157.7 LBS | HEART RATE: 72 BPM

## 2022-11-09 DIAGNOSIS — R53.81 CHRONIC FATIGUE AND MALAISE: ICD-10-CM

## 2022-11-09 DIAGNOSIS — F33.1 MODERATE EPISODE OF RECURRENT MAJOR DEPRESSIVE DISORDER (H): Primary | ICD-10-CM

## 2022-11-09 DIAGNOSIS — F41.1 GAD (GENERALIZED ANXIETY DISORDER): ICD-10-CM

## 2022-11-09 DIAGNOSIS — R53.82 CHRONIC FATIGUE AND MALAISE: ICD-10-CM

## 2022-11-09 DIAGNOSIS — S63.502A WRIST SPRAIN, LEFT, INITIAL ENCOUNTER: ICD-10-CM

## 2022-11-09 LAB
ALBUMIN SERPL BCG-MCNC: 4.6 G/DL (ref 3.5–5.2)
ALP SERPL-CCNC: 71 U/L (ref 40–129)
ALT SERPL W P-5'-P-CCNC: 15 U/L (ref 10–50)
ANION GAP SERPL CALCULATED.3IONS-SCNC: 12 MMOL/L (ref 7–15)
AST SERPL W P-5'-P-CCNC: 21 U/L (ref 10–50)
BILIRUB SERPL-MCNC: 0.3 MG/DL
BUN SERPL-MCNC: 7.9 MG/DL (ref 6–20)
CALCIUM SERPL-MCNC: 9.4 MG/DL (ref 8.6–10)
CHLORIDE SERPL-SCNC: 100 MMOL/L (ref 98–107)
CREAT SERPL-MCNC: 1 MG/DL (ref 0.67–1.17)
DEPRECATED HCO3 PLAS-SCNC: 26 MMOL/L (ref 22–29)
ERYTHROCYTE [DISTWIDTH] IN BLOOD BY AUTOMATED COUNT: 11.9 % (ref 10–15)
GFR SERPL CREATININE-BSD FRML MDRD: >90 ML/MIN/1.73M2
GLUCOSE SERPL-MCNC: 94 MG/DL (ref 70–99)
HBA1C MFR BLD: 5.3 % (ref 0–5.6)
HCT VFR BLD AUTO: 45.6 % (ref 40–53)
HGB BLD-MCNC: 15.7 G/DL (ref 13.3–17.7)
MCH RBC QN AUTO: 29.5 PG (ref 26.5–33)
MCHC RBC AUTO-ENTMCNC: 34.4 G/DL (ref 31.5–36.5)
MCV RBC AUTO: 86 FL (ref 78–100)
PLATELET # BLD AUTO: 243 10E3/UL (ref 150–450)
POTASSIUM SERPL-SCNC: 4.8 MMOL/L (ref 3.4–5.3)
PROT SERPL-MCNC: 6.9 G/DL (ref 6.4–8.3)
RBC # BLD AUTO: 5.33 10E6/UL (ref 4.4–5.9)
SODIUM SERPL-SCNC: 138 MMOL/L (ref 136–145)
TSH SERPL DL<=0.005 MIU/L-ACNC: 1.17 UIU/ML (ref 0.3–4.2)
WBC # BLD AUTO: 7.3 10E3/UL (ref 4–11)

## 2022-11-09 PROCEDURE — 83036 HEMOGLOBIN GLYCOSYLATED A1C: CPT | Performed by: INTERNAL MEDICINE

## 2022-11-09 PROCEDURE — 99214 OFFICE O/P EST MOD 30 MIN: CPT | Performed by: INTERNAL MEDICINE

## 2022-11-09 PROCEDURE — 84443 ASSAY THYROID STIM HORMONE: CPT | Performed by: INTERNAL MEDICINE

## 2022-11-09 PROCEDURE — 82306 VITAMIN D 25 HYDROXY: CPT | Performed by: INTERNAL MEDICINE

## 2022-11-09 PROCEDURE — 80053 COMPREHEN METABOLIC PANEL: CPT | Performed by: INTERNAL MEDICINE

## 2022-11-09 PROCEDURE — 85027 COMPLETE CBC AUTOMATED: CPT | Performed by: INTERNAL MEDICINE

## 2022-11-09 PROCEDURE — 36415 COLL VENOUS BLD VENIPUNCTURE: CPT | Performed by: INTERNAL MEDICINE

## 2022-11-09 ASSESSMENT — ANXIETY QUESTIONNAIRES
IF YOU CHECKED OFF ANY PROBLEMS ON THIS QUESTIONNAIRE, HOW DIFFICULT HAVE THESE PROBLEMS MADE IT FOR YOU TO DO YOUR WORK, TAKE CARE OF THINGS AT HOME, OR GET ALONG WITH OTHER PEOPLE: VERY DIFFICULT
7. FEELING AFRAID AS IF SOMETHING AWFUL MIGHT HAPPEN: NOT AT ALL
5. BEING SO RESTLESS THAT IT IS HARD TO SIT STILL: NEARLY EVERY DAY
3. WORRYING TOO MUCH ABOUT DIFFERENT THINGS: NEARLY EVERY DAY
GAD7 TOTAL SCORE: 16
8. IF YOU CHECKED OFF ANY PROBLEMS, HOW DIFFICULT HAVE THESE MADE IT FOR YOU TO DO YOUR WORK, TAKE CARE OF THINGS AT HOME, OR GET ALONG WITH OTHER PEOPLE?: VERY DIFFICULT
1. FEELING NERVOUS, ANXIOUS, OR ON EDGE: MORE THAN HALF THE DAYS
6. BECOMING EASILY ANNOYED OR IRRITABLE: NEARLY EVERY DAY
GAD7 TOTAL SCORE: 16
GAD7 TOTAL SCORE: 16
2. NOT BEING ABLE TO STOP OR CONTROL WORRYING: NEARLY EVERY DAY
4. TROUBLE RELAXING: MORE THAN HALF THE DAYS
7. FEELING AFRAID AS IF SOMETHING AWFUL MIGHT HAPPEN: NOT AT ALL

## 2022-11-09 ASSESSMENT — PATIENT HEALTH QUESTIONNAIRE - PHQ9
SUM OF ALL RESPONSES TO PHQ QUESTIONS 1-9: 20
SUM OF ALL RESPONSES TO PHQ QUESTIONS 1-9: 20
10. IF YOU CHECKED OFF ANY PROBLEMS, HOW DIFFICULT HAVE THESE PROBLEMS MADE IT FOR YOU TO DO YOUR WORK, TAKE CARE OF THINGS AT HOME, OR GET ALONG WITH OTHER PEOPLE: VERY DIFFICULT

## 2022-11-09 NOTE — PATIENT INSTRUCTIONS
Follow-up multiple issues, it has been more than 2 years since my last visit with Alo which was in spring 2020, right beginning of the pandemic.    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  He comes in today's visit accompanied by his mom, who comes for moral support.    Evin has been struggling over the last year, and I think there is now a major problem with mental health as detailed below.  Alo reports this has affected his performance in school, and he has seriously considered taking a reduced academic load, to compensate.  He is still living on campus.  He reports lack of motivation.    Symptoms of major depression (PHQ score 20), generalized anxiety, poor motivation, difficulty with concentrating.  He recalled working with a psychologist in [place], met with them maybe 3 or 4 times, then lost to follow-up.  Was not on medication.    I wanted to check his metabolic status, to make sure there is not an underlying problem of hypothyroidism, or vitamin D deficiency which she has had before.    I am going to go ahead and put in a request for behavioral health consultation for stabilization.    Alo told me that he consumes minimal alcohol, 0 drugs.  He, he is only taking 2 academic credits as of this fall semester of 2022.  The courses he is taking are sophomore and travon seminar    For the time being, I agree with Alo focusing on healthy sleep, good nutrition, controlling stress, reducing his academic load.  If we do not find anything metabolically, I might try Alo on some venlafaxine, which is an antidepressant that might be particular useful for improving the motivational aspect of his symptoms.    Left wrist sprain, from a fall injury that occurred September 2022  We will have her see orthopedics, because he may need a more detailed assessment because of his history of hyperextensibility.    Vestibular  migraine  Was working with Dr. Fitzpatrick here at [facility] neurology.    Dr. Fitzpatrick prescribe Depakote, but you are its not taking it anymore because the Depakote made his head feel foggy, nor is he taking rizatriptan.    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  He was also evaluated by ENT and was noted to have right sensorineural hearing loss and genetic testing was offered for possible Marfan syndrome, Tracie-Danlos syndrome.     9-  IMPRESSION:  1.  Normal head MRA without evidence of cerebral aneurysm.    8-3-2020  IMPRESSION:  1.  No acute intracranial process.  2.  Asymmetric nonenhancing T2 FLAIR hyperintensity left posterior thalamus.    History of pulmonary valve stenosis, borderline dilatation of ascending aorta    8-  Normal echocardiogram. There is normal appearance and motion of the tricuspid, mitral, pulmonary and aortic valves. No atrial, ventricular or arterial level shunting. RPA appears normal, the main and left PA are not seen well. The aortic root at the sinus of Valsalva, sinotubular ridge and proximal ascending aorta are normal. The left and right ventricles have normal chamber size, wall thickness, and systolic function. The calculated biplane left ventricular ejection fraction is 63 %.    Jeremy Cottrell M.D.   of Pediatrics  Pediatric and Adult Congenital Cardiology  [facility]  Pediatric Cardiology Office [phone]     Joint hypermobility and mild ascending aorta enlargement, question of collagen vascular  disease.  [facility] notes say that a genetic evaluation occurred at [facility] August 2015. Genetic testing was recommended for Marfan syndrome, Tracie-Danlos syndrome, and  Loeys Isidra syndrome.     CT angiography done as part of his emergency department evaluation on July 15 reported no significant aortic aneurysmal dilatation.  Borderline ectasia ascending aorta.     History of right pelvis fracture senior year in high school from a downhill skiing accident, but he made a good recovery.    Right-sided low-frequency hearing loss  He has had ENT and audiology evaluations at [facility], most recently October 3, 2018 with Dr. Wilner Ramirez.  Is reported as having low-frequency sensorineural hearing loss on the right.  Normal on the left.    COVID January 2021, relatively mild illness with sore throat, complete recovery, no leftover effects    Immunization History   Administered Date(s) Administered    COVID-19,PF,Moderna 05/04/2021    DTAP (<7y) 2000, 02/01/2001, 04/02/2001, 01/02/2002, 02/09/2006    Hep B, Peds or Adolescent 2000, 02/01/2001, 04/02/2001    HepA-ped 2 Dose 08/28/2019    Influenza Vaccine, 6+MO IM (QUADRIVALENT W/PRESERVATIVES) 11/11/2016    MMR 01/02/2002, 02/09/2006    Meningococcal (Menveo ) 11/11/2016, 08/28/2019    Pedvax-hib 2000, 02/01/2001, 04/02/2001    Pneumococcal (PCV 7) 02/01/2001, 04/02/2001, 07/16/2001, 01/02/2002    Poliovirus, inactivated (IPV) 2000, 02/01/2001, 04/02/2001, 02/09/2006    Tdap (Adacel,Boostrix) 08/16/2013    Varicella 01/02/2002, 08/16/2013

## 2022-11-09 NOTE — PROGRESS NOTES
Office Visit - Follow Up   Evin Mendoza   22 year old male    Date of Visit: 11/9/2022    Chief Complaint   Patient presents with     Nutrition Counseling     Nutrition concerns     Other concerns as well        -------------------------------------------------------------------------------------------------------------------------  Assessment and Plan    Follow-up multiple issues, it has been more than 2 years since my last visit with Alo which was in spring 2020, right at beginning of the pandemic.    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  He comes in today's visit accompanied by his mom, who comes for moral support.    Evin has been struggling over the last year, and I think there is now a major problem with mental health as detailed below.  Alo reports this has affected his performance in school, and he has seriously considered taking a reduced academic load, to compensate.  He is still living on campus.  He reports lack of motivation.    Symptoms of major depression (PHQ score 20), generalized anxiety, poor motivation, difficulty with concentrating.  He recalled working with a psychologist in [place], met with them maybe 3 or 4 times, then lost to follow-up.  Was not on medication.    I want to check his metabolic status, to make sure there is not an underlying problem of hypothyroidism, or vitamin D deficiency which she has had before.    I am going to go ahead and put in a request for behavioral health consultation for stabilization.    Alo told me that he consumes minimal alcohol, 0 drugs.  He is only taking 2 academic credits as of this fall semester of 2022.  The courses he is taking are sophomore and travon seminar    For the time being, I agree with Alo focusing on healthy sleep, good nutrition, controlling stress, reducing his academic load.  If we do not find anything metabolically, I might try  Alo on some venlafaxine, which is an antidepressant that might be particular useful for improving the motivational aspect of his symptoms.    Left wrist sprain, from a fall injury that occurred September 2022  We will have her see orthopedics, because he may need a more detailed assessment because of his history of hyperextensibility.    Vestibular migraine  Was working with Dr. Fitzpatrick here at [facility] neurology.    Dr. Fitzpatrick prescribe Depakote, but you are its not taking it anymore because the Depakote made his head feel foggy, nor is he taking rizatriptan.    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  He was also evaluated by ENT and was noted to have right sensorineural hearing loss and genetic testing was offered for possible Marfan syndrome, Tracie-Danlos syndrome.     9-  IMPRESSION:  1.  Normal head MRA without evidence of cerebral aneurysm.    8-3-2020  IMPRESSION:  1.  No acute intracranial process.  2.  Asymmetric nonenhancing T2 FLAIR hyperintensity left posterior thalamus.    History of pulmonary valve stenosis, borderline dilatation of ascending aorta    8-  Normal echocardiogram. There is normal appearance and motion of the tricuspid, mitral, pulmonary and aortic valves. No atrial, ventricular or arterial level shunting. RPA appears normal, the main and left PA are not seen well. The aortic root at the sinus of Valsalva, sinotubular ridge and proximal ascending aorta are normal. The left and right ventricles have normal chamber size, wall thickness, and systolic function. The calculated biplane left ventricular ejection fraction is 63 %.    Jeremy Cottrell M.D.   of Pediatrics  Pediatric and Adult Congenital Cardiology  [facility]  [facility]  Pediatric Cardiology Office [phone]     Joint hypermobility and mild ascending aorta enlargement, question of collagen vascular  disease.  [facility] notes say that a genetic evaluation occurred at [facility] August 2015. Genetic testing was recommended for Marfan syndrome, Tracie-Danlos syndrome, and Loeys Isidra syndrome.     CT angiography done as part of his emergency department evaluation on July 15 reported no significant aortic aneurysmal dilatation.  Borderline ectasia ascending aorta.     History of right pelvis fracture senior year in high school from a downhill skiing accident, but he made a good recovery.    Right-sided low-frequency hearing loss  He has had ENT and audiology evaluations at [facility], most recently October 3, 2018 with Dr. Wilner Ramirez.  Is reported as having low-frequency sensorineural hearing loss on the right.  Normal on the left.    COVID January 2021, relatively mild illness with sore throat, complete recovery, no leftover effects    Immunization History   Administered Date(s) Administered     COVID-19,PF,Moderna 05/04/2021     DTAP (<7y) 2000, 02/01/2001, 04/02/2001, 01/02/2002, 02/09/2006     Hep B, Peds or Adolescent 2000, 02/01/2001, 04/02/2001     HepA-ped 2 Dose 08/28/2019     Influenza Vaccine, 6+MO IM (QUADRIVALENT W/PRESERVATIVES) 11/11/2016     MMR 01/02/2002, 02/09/2006     Meningococcal (Menveo ) 11/11/2016, 08/28/2019     Pedvax-hib 2000, 02/01/2001, 04/02/2001     Pneumococcal (PCV 7) 02/01/2001, 04/02/2001, 07/16/2001, 01/02/2002     Poliovirus, inactivated (IPV) 2000, 02/01/2001, 04/02/2001, 02/09/2006     Tdap (Adacel,Boostrix) 08/16/2013     Varicella 01/02/2002, 08/16/2013         --------------------------------------------------------------------------------------------------------------------------  History of Present Illness  This 22 year old old      Back Pain:  He presents for  "follow up of back pain. Patient's back pain is a recurring problem.  Location of back pain:  Right lower back, left lower back, right upper back, left upper back, right side of neck, left side of neck, right shoulder and left shoulder  Description of back pain: dull ache  Back pain spreads: right side of neck and left side of neck    Since patient first noticed back pain, pain is: always present, but gets better and worse  Does back pain interfere with his job:  No    Mental Health Follow-up:  Patient presents to follow-up on Depression & Anxiety.Patient's depression since last visit has been:  Worse  The patient is having other symptoms associated with depression.  Patient's anxiety since last visit has been:  Worse  The patient is having other symptoms associated with anxiety.  Any significant life events: No  Patient is feeling anxious or having panic attacks.  Patient has no concerns about alcohol or drug use.    Headaches:   Since the patient's last clinic visit, headaches are: no change  The patient is getting headaches:  2 a week  He is not able to do normal daily activities when he has a migraine.  The patient is taking the following rescue/relief medications:  No rescue/relief medications and Ibuprofen (Advil, Motrin)   Patient states \"I get only a small amount of relief\" from the rescue/relief medications.   The patient is taking the following medications to prevent migraines:  No medications to prevent migraines and Topomax  In the past 4 weeks, the patient has gone to an Urgent Care or Emergency Room 0 times times due to headaches.    He eats 0-1 servings of fruits and vegetables daily.He consumes 1 sweetened beverage(s) daily.He exercises with enough effort to increase his heart rate 9 or less minutes per day.  He exercises with enough effort to increase his heart rate 3 or less days per week.   He is taking medications regularly.    Today's PHQ-9         PHQ-9 Total Score: 20  PHQ-9 Q9 Thoughts of " better off dead/self-harm past 2 weeks :   More than half the days  Thoughts of suicide or self harm: (P) No  Self-harm Plan:     Self-harm Action:       Safety concerns for self or others: (P) No    How difficult have these problems made it for you to do your work, take care of things at home, or get along with other people: Very difficult  Today's SANJAY-7 Score: 16     {ALERT  Recent PHQ-9 score indicates suicidal ideations :      Wt Readings from Last 3 Encounters:   11/09/22 71.5 kg (157 lb 11.2 oz)   04/14/21 77.1 kg (170 lb)   10/12/20 75.8 kg (167 lb)     BP Readings from Last 3 Encounters:   11/09/22 118/72   04/14/21 129/79   10/12/20 136/76         ---------------------------------------------------------------------------------------------------------------------------    Medications, Allergies, Social, and Problem List   Current Outpatient Medications   Medication Sig Dispense Refill     divalproex sodium extended-release (DEPAKOTE ER) 500 MG 24 hr tablet TAKE 2 TABLETS BY MOUTH DAILY 60 tablet 2     rizatriptan (MAXALT-MLT) 10 MG ODT Take 1 tablet (10 mg) by mouth at onset of headache for migraine (May repaet x 1 in 2 hours) May repeat in 2 hours. Max 3 tablets/24 hours. 18 tablet 11     Allergies   Allergen Reactions     Amoxicillin Hives     Oxycodone Other (See Comments) and Swelling     Other reaction(s): facial swelling  Face swelling       Penicillins Hives, Rash and Unknown     Other reaction(s): hive     Social History     Tobacco Use     Smoking status: Never     Smokeless tobacco: Never   Substance Use Topics     Alcohol use: Never     Comment: very minimal     Drug use: Never     Patient Active Problem List   Diagnosis     Ascending aorta dilatation (H)     Benign lipomatous tumor     Dilatation of pulmonic artery (H)     Disorder of connective tissue (H)     Vestibular migraine     Sensorineural hearing loss     Lesion of brain        Reviewed, reconciled and updated       Physical Exam  "  General Appearance:       /72 (BP Location: Right arm, Patient Position: Sitting, Cuff Size: Adult Large)   Pulse 72   Temp 99.1  F (37.3  C) (Oral)   Ht 1.803 m (5' 11\")   Wt 71.5 kg (157 lb 11.2 oz)   SpO2 99%   BMI 21.99 kg/m      Pleasant, affect seems a bit melancholy  Lungs clear  Heart regular rate and rhythm no murmur  Abdomen nontender  Extremities no edema     Additional Information   I spent 30 minutes on this encounter, including reviewing interval history since last visit, examining the patient, explaining and counseling the issues enumerated in the Assessment and Plan (patient given a copy), ordering indicated tests,  ordering referrals.       JENNIFER JAMES MD, MD        "

## 2022-11-10 LAB — DEPRECATED CALCIDIOL+CALCIFEROL SERPL-MC: 31 UG/L (ref 20–75)

## 2022-11-25 ENCOUNTER — OFFICE VISIT (OUTPATIENT)
Dept: FAMILY MEDICINE | Facility: CLINIC | Age: 22
End: 2022-11-25
Payer: COMMERCIAL

## 2022-11-25 VITALS
DIASTOLIC BLOOD PRESSURE: 87 MMHG | HEART RATE: 67 BPM | RESPIRATION RATE: 16 BRPM | OXYGEN SATURATION: 99 % | TEMPERATURE: 98.6 F | WEIGHT: 149.6 LBS | SYSTOLIC BLOOD PRESSURE: 137 MMHG | BODY MASS INDEX: 20.86 KG/M2

## 2022-11-25 DIAGNOSIS — K21.9 GASTROESOPHAGEAL REFLUX DISEASE, UNSPECIFIED WHETHER ESOPHAGITIS PRESENT: Primary | ICD-10-CM

## 2022-11-25 PROCEDURE — 99214 OFFICE O/P EST MOD 30 MIN: CPT | Performed by: PHYSICIAN ASSISTANT

## 2022-11-25 RX ORDER — CLONAZEPAM 0.5 MG/1
TABLET ORAL
COMMUNITY
Start: 2022-11-17 | End: 2023-01-05

## 2022-11-25 RX ORDER — BUSPIRONE HYDROCHLORIDE 7.5 MG/1
TABLET ORAL
COMMUNITY
Start: 2022-11-17 | End: 2023-01-05

## 2022-11-25 RX ORDER — BUPROPION HYDROCHLORIDE 150 MG/1
TABLET, EXTENDED RELEASE ORAL
COMMUNITY
Start: 2022-11-17 | End: 2023-01-05

## 2022-11-25 RX ORDER — OMEPRAZOLE 20 MG/1
40 TABLET, DELAYED RELEASE ORAL DAILY
Qty: 60 TABLET | Refills: 0 | Status: SHIPPED | OUTPATIENT
Start: 2022-11-25 | End: 2022-12-19

## 2022-11-25 NOTE — PROGRESS NOTES
Assessment & Plan:      Problem List Items Addressed This Visit    None  Visit Diagnoses     Gastroesophageal reflux disease, unspecified whether esophagitis present    -  Primary    Relevant Medications    omeprazole (PRILOSEC OTC) 20 MG EC tablet    Other Relevant Orders    Adult GI  Referral - Consult Only        Medical Decision Making  Patient presents with 1 to 2 months of gradually worsening nausea, frequent belching, and emesis.  Symptoms today appear concerning for chronic acid reflux.  Recommend starting on omeprazole.  Take 20 mg once daily for the first 3 to 5 days, then increase to 40 mg once daily.  Placed referral to gastroenterology in case symptoms not improving in 1 week for further evaluation and treatment as needed.  Reviewed patient's previous labs and he had normal CBC, CMP, thyroid, vitamin D, and A1c.  Given that patient has no significant abdominal tenderness, very low concern for cholecystitis versus appendicitis.    30 minutes spent in total for chart review, patient examination, and discussion with patient on labs, counseling, and coordination of care as listed above.     Subjective:      History provided by the patient.  He is also here with his mother.  Eivn Mendoza is a 22 year old male here for evaluation of nausea, frequent belching, and emesis.  Onset of symptoms was 2 months ago with significant worsening over the last couple weeks.  Patient was previously seen for the symptoms, and there were thought to be due to high levels of anxiety.  However, patient does not feel anxious.  He did follow-up with psychiatry who started patient on medications, which seemed to worsen symptoms rather than improved.  Symptoms of nausea and emesis are worse in the morning especially with the first meal.  Patient initially vomited up what appears to be mucus and bile.  After he has his first meal the day he will then sometimes vomit up undigested food.  After that first meal,  symptoms seem to improve the rest of the day.     The following portions of the patient's history were reviewed and updated as appropriate: allergies, current medications, and problem list.     Review of Systems  Pertinent items are noted in HPI.    Allergies  Allergies   Allergen Reactions     Amoxicillin Hives     Oxycodone Other (See Comments) and Swelling     Other reaction(s): facial swelling  Face swelling       Penicillins Hives, Rash and Unknown     Other reaction(s): hive       Family History   Problem Relation Age of Onset     Hypertension Mother      Hyperlipidemia Mother      Migraines Father      Myocardial Infarction Paternal Grandfather      Hypertension Paternal Grandfather      Depression Maternal Grandmother      Osteoporosis Maternal Grandmother      Hypertension Maternal Grandfather      Cerebral aneurysm Other      Coronary Artery Disease Paternal Grandfather      Bone Cancer Maternal Uncle         Knee       Social History     Tobacco Use     Smoking status: Never     Smokeless tobacco: Never   Substance Use Topics     Alcohol use: Never     Comment: very minimal        Objective:      /87   Pulse 67   Temp 98.6  F (37  C) (Oral)   Resp 16   Wt 67.9 kg (149 lb 9.6 oz)   SpO2 99%   BMI 20.86 kg/m    General appearance - alert, well appearing, and in no distress and non-toxic  Chest - clear to auscultation, no wheezes, rales or rhonchi, symmetric air entry  Heart - normal rate, regular rhythm, normal S1, S2, no murmurs, rubs, clicks or gallops  Abdomen - soft, nontender, nondistended, no masses or organomegaly     Lab & Imaging Results    No results found for any visits on 11/25/22.    I personally reviewed these results and discussed findings with the patient.    The use of Dragon/[employer] dictation services was used to construct the content of this note; any grammatical errors are non-intentional. Please contact the author directly if you are in need of any clarification.

## 2022-12-02 ENCOUNTER — OFFICE VISIT (OUTPATIENT)
Dept: FAMILY MEDICINE | Facility: CLINIC | Age: 22
End: 2022-12-02
Payer: COMMERCIAL

## 2022-12-02 ENCOUNTER — ANCILLARY PROCEDURE (OUTPATIENT)
Dept: GENERAL RADIOLOGY | Facility: CLINIC | Age: 22
End: 2022-12-02
Attending: EMERGENCY MEDICINE
Payer: COMMERCIAL

## 2022-12-02 VITALS
WEIGHT: 154 LBS | BODY MASS INDEX: 21.48 KG/M2 | DIASTOLIC BLOOD PRESSURE: 76 MMHG | OXYGEN SATURATION: 97 % | RESPIRATION RATE: 16 BRPM | HEART RATE: 74 BPM | TEMPERATURE: 98.1 F | SYSTOLIC BLOOD PRESSURE: 131 MMHG

## 2022-12-02 DIAGNOSIS — M77.52 CALCANEAL BURSITIS (HEEL), LEFT: ICD-10-CM

## 2022-12-02 DIAGNOSIS — M79.672 PAIN OF LEFT HEEL: ICD-10-CM

## 2022-12-02 DIAGNOSIS — M79.672 PAIN OF LEFT HEEL: Primary | ICD-10-CM

## 2022-12-02 PROCEDURE — 73650 X-RAY EXAM OF HEEL: CPT | Mod: TC | Performed by: RADIOLOGY

## 2022-12-02 PROCEDURE — 99213 OFFICE O/P EST LOW 20 MIN: CPT | Performed by: EMERGENCY MEDICINE

## 2022-12-02 NOTE — PROGRESS NOTES
Impression:  Left calcaneal bursitis    Plan:  Ice, elevate, Tylenol for pain, follow-up with primary care if not improved in 1 to 2 weeks      Chief Complaint:  Patient presents with:  Injury: Left heel pain for 1.5 week         HPI:   Evin Mendoza is a 22 year old male who presents to this clinic for the evaluation of left heel pain.  Patient jumped off a couch about a week and a half ago.  He landed on his feet but developed immediate pain in the left heel.  He has been resting it but the heel pain continues.  The pain is on the medial aspect of the left heel, does not radiate anywhere.  No numbness or weakness.  No other complaints      PMH:   Past Medical History:   Diagnosis Date     Brain lesion     x2     Heart murmur      Low iron      Migraines      Past Surgical History:   Procedure Laterality Date     CYST REMOVAL       HC REPAIR COLLAT LIGAMENT, MCP/IP JOINT Left 4/11/2016    Procedure: LEFT THUMB METACARPOPHALANGEAL JOINT ULNAR COLLATERAL LIGAMENT REPAIR WITH RECONSTRUCTION;  Surgeon: Chase Lainez MD;  Location: [facility];  Service: Orthopedics     TONSILLECTOMY       TONSILLECTOMY       TYMPANOSTOMY TUBE PLACEMENT           ROS:  All other systems negative    Meds:    Current Outpatient Medications:      omeprazole (PRILOSEC OTC) 20 MG EC tablet, Take 2 tablets (40 mg) by mouth daily for 30 days, Disp: 60 tablet, Rfl: 0     buPROPion (WELLBUTRIN SR) 150 MG 12 hr tablet, TAKE 1 TABLET EVERY MORNING FOR 4 DAYS, THEN INCREASE TO 1 TABLET TWICE DAILY FROM THE FIFTH DAY., Disp: , Rfl:      busPIRone (BUSPAR) 7.5 MG tablet, TAKE 1 TABLET TWICE DAILY IN THE MORNING AND EVENING, Disp: , Rfl:      clonazePAM (KLONOPIN) 0.5 MG tablet, TAKE 1 TABLET TWICE DAILY FOR TWO WEEKS THEN STOP., Disp: , Rfl:         Social:  Social History     Socioeconomic History     Marital status: Single     Spouse name: Not on file     Number of children: Not on file     Years of education: Not on file      Highest education level: Not on file   Occupational History     Not on file   Tobacco Use     Smoking status: Never     Smokeless tobacco: Never   Substance and Sexual Activity     Alcohol use: Never     Comment: very minimal     Drug use: Never     Sexual activity: Not on file   Other Topics Concern     Parent/sibling w/ CABG, MI or angioplasty before 65F 55M? No   Social History Narrative     Not on file     Social Determinants of Health     Financial Resource Strain: Not on file   Food Insecurity: Not on file   Transportation Needs: Not on file   Physical Activity: Not on file   Stress: Not on file   Social Connections: Not on file   Intimate Partner Violence: Not on file   Housing Stability: Not on file         Physical Exam:  Vitals:    12/02/22 1440   BP: 131/76   Pulse: 74   Resp: 16   Temp: 98.1  F (36.7  C)   TempSrc: Oral   SpO2: 97%   Weight: 69.9 kg (154 lb)      Patient is awake, alert, no distress  Left foot shows tenderness over the left calcaneus on the inferior and medial aspect of the foot.  There is no tenderness of the ankle or malleoli, no tenderness of the rest of the foot.  Distal sensation motor function are normal      Results:    No results found for this or any previous visit (from the past 24 hour(s)).           Marcelino Nam MD   [employer]

## 2022-12-17 DIAGNOSIS — K21.9 GASTROESOPHAGEAL REFLUX DISEASE, UNSPECIFIED WHETHER ESOPHAGITIS PRESENT: ICD-10-CM

## 2022-12-18 NOTE — TELEPHONE ENCOUNTER
"Routing refill request to provider for review/approval because:  Drug not active on patient's medication list    Last Written Prescription Date:    Last Fill Quantity: ,  # refills:    Last office visit provider:  11/9/22     Requested Prescriptions   Pending Prescriptions Disp Refills     omeprazole (PRILOSEC) 20 MG DR capsule [Pharmacy Med Name: OMEPRAZOLE DR 20 MG CAPSULE] 60 capsule      Sig: TAKE 2 CAPSULES BY MOUTH DAILY FOR 30 DAYS       PPI Protocol Failed - 12/17/2022 10:31 AM        Failed - Medication is active on med list        Passed - Not on Clopidogrel (unless Pantoprazole ordered)        Passed - No diagnosis of osteoporosis on record        Passed - Recent (12 mo) or future (30 days) visit within the authorizing provider's specialty     Patient has had an office visit with the authorizing provider or a provider within the authorizing providers department within the previous 12 mos or has a future within next 30 days. See \"Patient Info\" tab in inbasket, or \"Choose Columns\" in Meds & Orders section of the refill encounter.              Passed - Patient is age 18 or older             Daniela Salinas RN 12/18/22 2:04 PM  "

## 2023-01-05 ENCOUNTER — OFFICE VISIT (OUTPATIENT)
Dept: INTERNAL MEDICINE | Facility: CLINIC | Age: 23
End: 2023-01-05
Payer: COMMERCIAL

## 2023-01-05 VITALS
WEIGHT: 151 LBS | RESPIRATION RATE: 16 BRPM | SYSTOLIC BLOOD PRESSURE: 146 MMHG | DIASTOLIC BLOOD PRESSURE: 76 MMHG | BODY MASS INDEX: 21.14 KG/M2 | HEIGHT: 71 IN | TEMPERATURE: 98.4 F | HEART RATE: 81 BPM | OXYGEN SATURATION: 99 %

## 2023-01-05 DIAGNOSIS — F33.1 MODERATE EPISODE OF RECURRENT MAJOR DEPRESSIVE DISORDER (H): Primary | ICD-10-CM

## 2023-01-05 DIAGNOSIS — F41.1 GAD (GENERALIZED ANXIETY DISORDER): ICD-10-CM

## 2023-01-05 PROCEDURE — 99214 OFFICE O/P EST MOD 30 MIN: CPT | Performed by: INTERNAL MEDICINE

## 2023-01-05 ASSESSMENT — ANXIETY QUESTIONNAIRES
1. FEELING NERVOUS, ANXIOUS, OR ON EDGE: MORE THAN HALF THE DAYS
2. NOT BEING ABLE TO STOP OR CONTROL WORRYING: SEVERAL DAYS
GAD7 TOTAL SCORE: 13
GAD7 TOTAL SCORE: 13
4. TROUBLE RELAXING: NEARLY EVERY DAY
5. BEING SO RESTLESS THAT IT IS HARD TO SIT STILL: MORE THAN HALF THE DAYS
GAD7 TOTAL SCORE: 13
8. IF YOU CHECKED OFF ANY PROBLEMS, HOW DIFFICULT HAVE THESE MADE IT FOR YOU TO DO YOUR WORK, TAKE CARE OF THINGS AT HOME, OR GET ALONG WITH OTHER PEOPLE?: VERY DIFFICULT
3. WORRYING TOO MUCH ABOUT DIFFERENT THINGS: MORE THAN HALF THE DAYS
7. FEELING AFRAID AS IF SOMETHING AWFUL MIGHT HAPPEN: NOT AT ALL
6. BECOMING EASILY ANNOYED OR IRRITABLE: NEARLY EVERY DAY
7. FEELING AFRAID AS IF SOMETHING AWFUL MIGHT HAPPEN: NOT AT ALL
IF YOU CHECKED OFF ANY PROBLEMS ON THIS QUESTIONNAIRE, HOW DIFFICULT HAVE THESE PROBLEMS MADE IT FOR YOU TO DO YOUR WORK, TAKE CARE OF THINGS AT HOME, OR GET ALONG WITH OTHER PEOPLE: VERY DIFFICULT

## 2023-01-05 ASSESSMENT — PATIENT HEALTH QUESTIONNAIRE - PHQ9
SUM OF ALL RESPONSES TO PHQ QUESTIONS 1-9: 13
10. IF YOU CHECKED OFF ANY PROBLEMS, HOW DIFFICULT HAVE THESE PROBLEMS MADE IT FOR YOU TO DO YOUR WORK, TAKE CARE OF THINGS AT HOME, OR GET ALONG WITH OTHER PEOPLE: VERY DIFFICULT
SUM OF ALL RESPONSES TO PHQ QUESTIONS 1-9: 13

## 2023-01-05 NOTE — PROGRESS NOTES
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  He comes in today's visit accompanied by his mom, who comes for moral support.     Symptoms of major depression (PHQ score 20, November 9, 2022, improved score of 13 January 5, 2023), generalized anxiety, poor motivation, difficulty with concentrating-- which have triggered academic suspension at [employer]  Symptoms hit at beginning of pandemic, and he became socially isolated    A trial of pharmacotherapy in mid November 2022 was unsuccessful, which consisted of bupropion, buspirone, and clonazepam.  Alo reported that after taking only a couple days of dosing, his mood was hard to manage, including angry outbursts.    He told me today January 5, 2023 that he would like to pursue a nonpharmacologic strategy if at all possible.    I told Alo that I think the route to pursue is counseling, which could be with a psychologist or other trained counselor (percent over the background social work).    I suggested he call over to [facility] which is here in [place], and ask what their availability is.    Alo told me that he is on academic suspension from [employer].    Currently living in [place] in his own apartment.  Since he is close to campus and at least at the moment still enrolled is a student, he will have access to student health services at [employer] [facility].  I told him he might consider pursuing mental health resources through the [facility]    For the time  being, I agree with Alo focusing on healthy sleep, good nutrition, controlling stress    Metabolic testing of November 9, 2022 was unremarkable including testing vitamin D levels, CBC, comprehensive metabolic panel, TSH, A1c    Gastroesophageal reflux  11- started omeprazole     Left wrist sprain, from a fall injury that occurred September 2022  We will have her see orthopedics, because he may need a more detailed assessment because of his history of hyperextensibility.    Vestibular migraine  Was working with Dr. Fitzpatrick here at [facility] neurology.    Dr. Fitzpatrick prescribe Depakote, but you are its not taking it anymore because the Depakote made his head feel foggy, nor is he taking rizatriptan.     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  He was also evaluated by ENT and was noted to have right sensorineural hearing loss and genetic testing was offered for possible Marfan syndrome, Tracie-Danlos syndrome.      9-  IMPRESSION:  1.  Normal head MRA without evidence of cerebral aneurysm.     8-3-2020  IMPRESSION:  1.  No acute intracranial process.  2.  Asymmetric nonenhancing T2 FLAIR hyperintensity left posterior thalamus.     History of pulmonary valve stenosis, borderline dilatation of ascending aorta  8-  Normal echocardiogram. There is normal appearance and motion of the tricuspid, mitral, pulmonary and aortic valves. No atrial, ventricular or arterial level shunting. RPA appears normal, the main and left PA are not seen well. The aortic root at the sinus of Valsalva, sinotubular ridge and proximal ascending aorta are normal. The left and right ventricles have normal chamber size, wall thickness, and systolic function. The calculated biplane left ventricular ejection fraction is 63  %.     Jeremy Cottrell M.D.   of Pediatrics  Pediatric and Adult Congenital Cardiology  [facility]  Pediatric Cardiology Office [phone]     Joint hypermobility and mild ascending aorta enlargement, question of collagen vascular  disease. [facility] notes say that a genetic evaluation occurred at [facility] August 2015. Genetic testing was recommended for Marfan syndrome, Tracie-Danlos syndrome, and Loeys Isidra syndrome.     CT angiography done as part of his emergency department evaluation on July 15 reported no significant aortic aneurysmal dilatation.  Borderline ectasia ascending aorta.     History of right pelvis fracture senior year in high school from a downhill skiing accident, but he made a good recovery.     Right-sided low-frequency hearing loss  He has had ENT and audiology evaluations at [facility], most recently October 3, 2018 with Dr. Wilner Ramirez.  Is reported as having low-frequency sensorineural hearing loss on the right.  Normal on the left.     COVID January 2021, relatively mild illness with sore throat, complete recovery, no leftover effects         --------------------------------------------------------------------------------------------------------------------------  History of Present Illness  This 22 year old old       Mental Health Follow-up:  Patient presents to follow-up on Depression & Anxiety.Patient's depression since last visit has been:  Better  The patient is not having other symptoms associated with depression.  Patient's anxiety since last visit has been:  Better  The patient is not having other symptoms associated with anxiety.  Any significant life events: No  Patient is not feeling anxious or having panic attacks.  Patient has no concerns about alcohol or drug use.     Reason for visit:  Check up on depression &anxiety, update dr tiwari on gerd diagnoses abd progress     He  eats 0-1 servings of fruits and vegetables daily.He consumes 2 sweetened beverage(s) daily.He exercises with enough effort to increase his heart rate 20 to 29 minutes per day.  He exercises with enough effort to increase his heart rate 4 days per week. He is missing 2 dose(s) of medications per week.     Today's PHQ-9         PHQ-9 Total Score: 13     PHQ-9 Q9 Thoughts of better off dead/self-harm past 2 weeks :   Several days  Thoughts of suicide or self harm: (P) Yes  Self-harm Plan:   (P) No  Self-harm Action:     (P) No  Safety concerns for self or others: (P) No     How difficult have these problems made it for you to do your work, take care of things at home, or get along with other people: Very difficult  Today's SANJAY-7 Score: 13     {ALERT  Recent PHQ-9 score indicates suicidal ideations :[id number]        Wt Readings from Last 3 Encounters:   01/05/23 68.5 kg (151 lb)   12/02/22 69.9 kg (154 lb)   11/25/22 67.9 kg (149 lb 9.6 oz)     BP Readings from Last 3 Encounters:   01/05/23 (!) 146/76   12/02/22 131/76   11/25/22 137/87     ---------------------------------------------------------------------------------------------------------------------------    Medications, Allergies, Social, and Problem List   Current Outpatient Medications   Medication Sig Dispense Refill     omeprazole (PRILOSEC) 20 MG DR capsule TAKE 2 CAPSULES BY MOUTH DAILY FOR 30 DAYS 60 capsule 0     buPROPion (WELLBUTRIN SR) 150 MG 12 hr tablet TAKE 1 TABLET EVERY MORNING FOR 4 DAYS, THEN INCREASE TO 1 TABLET TWICE DAILY FROM THE FIFTH DAY. (Patient not taking: Reported on 1/5/2023)       busPIRone (BUSPAR) 7.5 MG tablet TAKE 1 TABLET TWICE DAILY IN THE MORNING AND EVENING (Patient not taking: Reported on 1/5/2023)       clonazePAM (KLONOPIN) 0.5 MG tablet TAKE 1 TABLET TWICE DAILY FOR TWO WEEKS THEN STOP. (Patient not taking: Reported on 1/5/2023)       Allergies   Allergen Reactions     Amoxicillin Hives     Oxycodone Other (See Comments)  "and Swelling     Other reaction(s): facial swelling  Face swelling       Penicillins Hives, Rash and Unknown     Other reaction(s): hive     Social History     Tobacco Use     Smoking status: Never     Smokeless tobacco: Never   Substance Use Topics     Alcohol use: Never     Comment: very minimal     Drug use: Never     Patient Active Problem List   Diagnosis     Ascending aorta dilatation (H)     Benign lipomatous tumor     Dilatation of pulmonic artery (H)     Disorder of connective tissue (H)     Vestibular migraine     Sensorineural hearing loss     Lesion of brain     Chronic fatigue and malaise     Moderate episode of recurrent major depressive disorder (H)     SANJAY (generalized anxiety disorder)        Reviewed, reconciled and updated       Physical Exam   General Appearance:       BP (!) 146/76 (BP Location: Right arm, Patient Position: Sitting, Cuff Size: Adult Regular)   Pulse 81   Temp 98.4  F (36.9  C)   Resp 16   Ht 1.803 m (5' 11\")   Wt 68.5 kg (151 lb)   SpO2 99%   BMI 21.06 kg/m      Alo seems brighter today, seems completely coherent, judgment and insight seem intact     Additional Information   I spent 30 minutes on this encounter, including reviewing interval history since last visit, examining the patient, explaining and counseling the issues enumerated in the Assessment and Plan (patient given a copy)       JENNIFER JAMES MD, MD          "

## 2023-01-05 NOTE — PATIENT INSTRUCTIONS
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  He comes in today's visit accompanied by his mom, who comes for moral support.     Symptoms of major depression (PHQ score 20, November 9, 2022, improved score of 13 January 5, 2023), generalized anxiety, poor motivation, difficulty with concentrating-- which have triggered academic suspension at [employer]  Symptoms hit at beginning of pandemic, and he became socially isolated    A trial of pharmacotherapy in mid November 2022 was unsuccessful, which consisted of bupropion, buspirone, and clonazepam.  Alo reported that after taking only a couple days of dosing, his mood was hard to manage, including angry outbursts.    He told me today January 5, 2023 that he would like to pursue a nonpharmacologic strategy if at all possible.    I told Alo that I think the route to pursue is counseling, which could be with a psychologist or other trained counselor (percent over the background social work).    I suggested he call over to [facility] which is here in [place], and ask what their availability is.    Alo told me that he is on academic suspension from [employer].    Currently living in [place] in his own apartment.  Since he is close to campus and at least at the moment still enrolled is a student, he will have access to student health services at [employer] [facility].  I told him he might consider pursuing mental health resources through the [facility]    For the time being, I agree with Alo focusing on healthy sleep, good nutrition, controlling stress    Metabolic testing of November 9, 2022 was unremarkable including testing vitamin D levels, CBC, comprehensive metabolic panel, TSH, A1c    Gastroesophageal reflux  11- started omeprazole     Left wrist sprain, from a fall  injury that occurred September 2022  We will have her see orthopedics, because he may need a more detailed assessment because of his history of hyperextensibility.    Vestibular migraine  Was working with Dr. Fitzpatrick here at [facility] neurology.    Dr. Fitzpatrick prescribe Depakote, but you are its not taking it anymore because the Depakote made his head feel foggy, nor is he taking rizatriptan.     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  He was also evaluated by ENT and was noted to have right sensorineural hearing loss and genetic testing was offered for possible Marfan syndrome, Tracie-Danlos syndrome.      9-  IMPRESSION:  1.  Normal head MRA without evidence of cerebral aneurysm.     8-3-2020  IMPRESSION:  1.  No acute intracranial process.  2.  Asymmetric nonenhancing T2 FLAIR hyperintensity left posterior thalamus.     History of pulmonary valve stenosis, borderline dilatation of ascending aorta  8-  Normal echocardiogram. There is normal appearance and motion of the tricuspid, mitral, pulmonary and aortic valves. No atrial, ventricular or arterial level shunting. RPA appears normal, the main and left PA are not seen well. The aortic root at the sinus of Valsalva, sinotubular ridge and proximal ascending aorta are normal. The left and right ventricles have normal chamber size, wall thickness, and systolic function. The calculated biplane left ventricular ejection fraction is 63 %.     Jeremy Cottrell M.D.   of Pediatrics  Pediatric and Adult Congenital Cardiology  [facility]  Pediatric Cardiology Office [phone]     Joint hypermobility and mild ascending aorta enlargement, question of collagen  vascular  disease. [facility] notes say that a genetic evaluation occurred at [facility] August 2015. Genetic testing was recommended for Marfan syndrome, Tracie-Danlos syndrome, and Loeys Isidra syndrome.     CT angiography done as part of his emergency department evaluation on July 15 reported no significant aortic aneurysmal dilatation.  Borderline ectasia ascending aorta.     History of right pelvis fracture senior year in high school from a downhill skiing accident, but he made a good recovery.     Right-sided low-frequency hearing loss  He has had ENT and audiology evaluations at [facility], most recently October 3, 2018 with Dr. Wilner Ramirez.  Is reported as having low-frequency sensorineural hearing loss on the right.  Normal on the left.     COVID January 2021, relatively mild illness with sore throat, complete recovery, no leftover effects

## 2023-01-06 DIAGNOSIS — K21.9 GASTROESOPHAGEAL REFLUX DISEASE, UNSPECIFIED WHETHER ESOPHAGITIS PRESENT: ICD-10-CM

## 2023-01-07 NOTE — TELEPHONE ENCOUNTER
" Routing refill request to provider for review/approval because:  Original prescription states for 30 days.    Last Written Prescription Date:  12/19/22  Last Fill Quantity: 60,  # refills: 0   Last office visit provider:  1/5/23    Requested Prescriptions   Pending Prescriptions Disp Refills     omeprazole (PRILOSEC) 20 MG DR capsule 60 capsule 0     Sig: TAKE 2 CAPSULES BY MOUTH DAILY FOR 30 DAYS  Strength: 20 mg       PPI Protocol Passed - 1/6/2023  8:05 AM        Passed - Not on Clopidogrel (unless Pantoprazole ordered)        Passed - No diagnosis of osteoporosis on record        Passed - Recent (12 mo) or future (30 days) visit within the authorizing provider's specialty     Patient has had an office visit with the authorizing provider or a provider within the authorizing providers department within the previous 12 mos or has a future within next 30 days. See \"Patient Info\" tab in inbasket, or \"Choose Columns\" in Meds & Orders section of the refill encounter.              Passed - Medication is active on med list        Passed - Patient is age 18 or older             Meghan Saravia RN 01/06/23 8:16 PM  "

## 2023-05-08 ENCOUNTER — HEALTH MAINTENANCE LETTER (OUTPATIENT)
Age: 23
End: 2023-05-08

## 2023-05-31 ENCOUNTER — APPOINTMENT (OUTPATIENT)
Dept: RADIOLOGY | Facility: CLINIC | Age: 23
End: 2023-05-31
Attending: EMERGENCY MEDICINE
Payer: COMMERCIAL

## 2023-05-31 ENCOUNTER — HOSPITAL ENCOUNTER (EMERGENCY)
Facility: CLINIC | Age: 23
Discharge: HOME | End: 2023-05-31
Attending: EMERGENCY MEDICINE | Admitting: EMERGENCY MEDICINE
Payer: COMMERCIAL

## 2023-05-31 VITALS
HEIGHT: 72 IN | OXYGEN SATURATION: 97 % | BODY MASS INDEX: 19.64 KG/M2 | SYSTOLIC BLOOD PRESSURE: 148 MMHG | WEIGHT: 145 LBS | TEMPERATURE: 97.8 F | DIASTOLIC BLOOD PRESSURE: 80 MMHG | RESPIRATION RATE: 28 BRPM | HEART RATE: 78 BPM

## 2023-05-31 DIAGNOSIS — F41.9 ANXIETY: ICD-10-CM

## 2023-05-31 DIAGNOSIS — R06.4 HYPERVENTILATION: ICD-10-CM

## 2023-05-31 LAB
ANION GAP SERPL CALCULATED.3IONS-SCNC: 14 MMOL/L (ref 5–18)
ATRIAL RATE - MUSE: 115 BPM
BASE EXCESS BLDV CALC-SCNC: 1.1 MMOL/L
BASOPHILS # BLD AUTO: 0 10E3/UL (ref 0–0.2)
BASOPHILS NFR BLD AUTO: 0 %
BUN SERPL-MCNC: 12 MG/DL (ref 8–22)
CALCIUM SERPL-MCNC: 10.3 MG/DL (ref 8.5–10.5)
CHLORIDE BLD-SCNC: 105 MMOL/L (ref 98–107)
CO2 SERPL-SCNC: 22 MMOL/L (ref 22–31)
CREAT SERPL-MCNC: 1.13 MG/DL (ref 0.7–1.3)
D DIMER PPP FEU-MCNC: <=0.27 UG/ML FEU (ref 0–0.5)
DIASTOLIC BLOOD PRESSURE - MUSE: NORMAL MMHG
EOSINOPHIL # BLD AUTO: 0 10E3/UL (ref 0–0.7)
EOSINOPHIL NFR BLD AUTO: 0 %
ERYTHROCYTE [DISTWIDTH] IN BLOOD BY AUTOMATED COUNT: 12.4 % (ref 10–15)
GFR SERPL CREATININE-BSD FRML MDRD: >90 ML/MIN/1.73M2
GLUCOSE BLD-MCNC: 96 MG/DL (ref 70–125)
HCO3 BLDV-SCNC: 23 MMOL/L (ref 24–30)
HCT VFR BLD AUTO: 47 % (ref 40–53)
HGB BLD-MCNC: 16.8 G/DL (ref 13.3–17.7)
HOLD SPECIMEN: NORMAL
IMM GRANULOCYTES # BLD: 0 10E3/UL
IMM GRANULOCYTES NFR BLD: 0 %
INTERPRETATION ECG - MUSE: NORMAL
LYMPHOCYTES # BLD AUTO: 2.9 10E3/UL (ref 0.8–5.3)
LYMPHOCYTES NFR BLD AUTO: 25 %
MCH RBC QN AUTO: 29.4 PG (ref 26.5–33)
MCHC RBC AUTO-ENTMCNC: 35.7 G/DL (ref 31.5–36.5)
MCV RBC AUTO: 82 FL (ref 78–100)
MONOCYTES # BLD AUTO: 1.1 10E3/UL (ref 0–1.3)
MONOCYTES NFR BLD AUTO: 10 %
NEUTROPHILS # BLD AUTO: 7.5 10E3/UL (ref 1.6–8.3)
NEUTROPHILS NFR BLD AUTO: 65 %
NRBC # BLD AUTO: 0 10E3/UL
NRBC BLD AUTO-RTO: 0 /100
OXYHGB MFR BLDV: 64.3 % (ref 70–75)
P AXIS - MUSE: 68 DEGREES
PCO2 BLDV: 24 MM HG (ref 35–50)
PH BLDV: 7.59 [PH] (ref 7.35–7.45)
PLATELET # BLD AUTO: 280 10E3/UL (ref 150–450)
PO2 BLDV: 26 MM HG (ref 25–47)
POTASSIUM BLD-SCNC: 3.9 MMOL/L (ref 3.5–5)
PR INTERVAL - MUSE: 94 MS
QRS DURATION - MUSE: 82 MS
QT - MUSE: 322 MS
QTC - MUSE: 445 MS
R AXIS - MUSE: 80 DEGREES
RBC # BLD AUTO: 5.71 10E6/UL (ref 4.4–5.9)
SAO2 % BLDV: 65.5 % (ref 70–75)
SODIUM SERPL-SCNC: 141 MMOL/L (ref 136–145)
SYSTOLIC BLOOD PRESSURE - MUSE: NORMAL MMHG
T AXIS - MUSE: 54 DEGREES
VENTRICULAR RATE- MUSE: 115 BPM
WBC # BLD AUTO: 11.7 10E3/UL (ref 4–11)

## 2023-05-31 PROCEDURE — 96361 HYDRATE IV INFUSION ADD-ON: CPT

## 2023-05-31 PROCEDURE — 258N000003 HC RX IP 258 OP 636

## 2023-05-31 PROCEDURE — 93005 ELECTROCARDIOGRAM TRACING: CPT | Performed by: EMERGENCY MEDICINE

## 2023-05-31 PROCEDURE — 36415 COLL VENOUS BLD VENIPUNCTURE: CPT | Performed by: EMERGENCY MEDICINE

## 2023-05-31 PROCEDURE — 82805 BLOOD GASES W/O2 SATURATION: CPT | Performed by: EMERGENCY MEDICINE

## 2023-05-31 PROCEDURE — 96374 THER/PROPH/DIAG INJ IV PUSH: CPT

## 2023-05-31 PROCEDURE — 250N000011 HC RX IP 250 OP 636

## 2023-05-31 PROCEDURE — 85379 FIBRIN DEGRADATION QUANT: CPT

## 2023-05-31 PROCEDURE — 71045 X-RAY EXAM CHEST 1 VIEW: CPT

## 2023-05-31 PROCEDURE — 80048 BASIC METABOLIC PNL TOTAL CA: CPT

## 2023-05-31 PROCEDURE — 85025 COMPLETE CBC W/AUTO DIFF WBC: CPT

## 2023-05-31 PROCEDURE — 99285 EMERGENCY DEPT VISIT HI MDM: CPT | Mod: 25

## 2023-05-31 RX ORDER — LORAZEPAM 2 MG/ML
2 INJECTION INTRAMUSCULAR ONCE
Status: DISCONTINUED | OUTPATIENT
Start: 2023-05-31 | End: 2023-05-31

## 2023-05-31 RX ORDER — LORAZEPAM 2 MG/ML
1 INJECTION INTRAMUSCULAR ONCE
Status: COMPLETED | OUTPATIENT
Start: 2023-05-31 | End: 2023-05-31

## 2023-05-31 RX ORDER — HYDROXYZINE PAMOATE 25 MG/1
25 CAPSULE ORAL 3 TIMES DAILY PRN
Qty: 18 CAPSULE | Refills: 0 | Status: SHIPPED | OUTPATIENT
Start: 2023-05-31

## 2023-05-31 RX ADMIN — SODIUM CHLORIDE 1000 ML: 9 INJECTION, SOLUTION INTRAVENOUS at 15:25

## 2023-05-31 RX ADMIN — LORAZEPAM 1 MG: 2 INJECTION INTRAMUSCULAR; INTRAVENOUS at 15:25

## 2023-05-31 ASSESSMENT — ENCOUNTER SYMPTOMS
CHILLS: 0
COUGH: 0
BACK PAIN: 0
ABDOMINAL PAIN: 0
SHORTNESS OF BREATH: 1
NAUSEA: 0
LIGHT-HEADEDNESS: 0
FEVER: 0
DIARRHEA: 0
VOMITING: 0
HEADACHES: 0

## 2023-05-31 ASSESSMENT — ACTIVITIES OF DAILY LIVING (ADL): ADLS_ACUITY_SCORE: 35

## 2023-05-31 NOTE — PROGRESS NOTES
Pharmacist Admission Medication History    Admission medication history is complete. The information provided in this note is only as accurate as the sources available at the time of the update.    Medication reconciliation/reorder completed by provider prior to medication history? No    Information Source(s): Patient, Family member and CareEverywhere/SureScripts via in-person    Pertinent Information: Patient took 1 capsule of Nyquil HBP today for his symptoms. He doesn't have high blood pressure, but it was what medication was available at his house.     Changes made to PTA medication list:    Added: None    Deleted: omeprazole    Changed: None    Allergies reviewed with patient and updates made in EHR: yes    Medication History Completed By: Ally Paige RPH 5/31/2023 4:13 PM    Prior to Admission medications    Not on File
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
## 2023-05-31 NOTE — ED TRIAGE NOTES
"Arrives to ED with c/o SOB 30 minutes PTA. \"It feels like my lungs are insufficient\". Hyperventilating in triage. C/O bilat hand cramping. Tachycardic in triage. Multiple medical issues.      Triage Assessment     Row Name 05/31/23 7008       Triage Assessment (Adult)    Airway WDL WDL       Respiratory WDL    Respiratory WDL X;rhythm/pattern    Rhythm/Pattern, Respiratory shortness of breath;tachypneic       Skin Circulation/Temperature WDL    Skin Circulation/Temperature WDL WDL       Cardiac WDL    Cardiac WDL X;rhythm    Pulse Rate & Regularity tachycardic       Peripheral/Neurovascular WDL    Peripheral Neurovascular WDL X;neurovascular assessment upper       LUE Neurovascular Assessment    Sensation LUE numbness present       RUE Neurovascular Assessment    Sensation RUE numbness present       Cognitive/Neuro/Behavioral WDL    Cognitive/Neuro/Behavioral WDL WDL              " negative...

## 2023-05-31 NOTE — DISCHARGE INSTRUCTIONS
Rest. Drink water. Take Vistaril as prescribed. Return to the ED if new symptoms develop or if symptoms worsen.  Care doctor to discuss your ED visit.  Go to [place] tomorrow for your scheduled appointment.
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
## 2024-07-14 ENCOUNTER — HEALTH MAINTENANCE LETTER (OUTPATIENT)
Age: 24
End: 2024-07-14

## 2025-07-19 ENCOUNTER — HEALTH MAINTENANCE LETTER (OUTPATIENT)
Age: 25
End: 2025-07-19